# Patient Record
Sex: FEMALE | Race: WHITE | Employment: OTHER | URBAN - METROPOLITAN AREA
[De-identification: names, ages, dates, MRNs, and addresses within clinical notes are randomized per-mention and may not be internally consistent; named-entity substitution may affect disease eponyms.]

---

## 2018-11-16 ENCOUNTER — HOSPITAL ENCOUNTER (OUTPATIENT)
Dept: MAMMOGRAPHY | Age: 73
Discharge: HOME | End: 2018-11-18
Payer: MEDICARE

## 2018-11-16 DIAGNOSIS — Z12.31 VISIT FOR SCREENING MAMMOGRAM: ICD-10-CM

## 2018-11-16 PROCEDURE — 77067 SCR MAMMO BI INCL CAD: CPT

## 2018-12-05 ENCOUNTER — TELEPHONE (OUTPATIENT)
Dept: ONCOLOGY | Age: 73
End: 2018-12-05

## 2023-02-05 PROBLEM — M81.0 OSTEOPOROSIS: Status: ACTIVE | Noted: 2023-02-05

## 2023-02-05 PROBLEM — H25.812 COMBINED FORM OF AGE-RELATED CATARACT, LEFT EYE: Status: ACTIVE | Noted: 2023-02-05

## 2023-02-05 PROBLEM — C44.91 SKIN CANCER, BASAL CELL: Status: ACTIVE | Noted: 2023-02-05

## 2023-02-05 PROBLEM — H52.4 PRESBYOPIA OF BOTH EYES: Status: ACTIVE | Noted: 2023-02-05

## 2023-02-05 PROBLEM — I10 BENIGN ESSENTIAL HYPERTENSION: Status: ACTIVE | Noted: 2023-02-05

## 2023-02-05 PROBLEM — H52.13 BILATERAL MYOPIA: Status: ACTIVE | Noted: 2023-02-05

## 2023-02-05 PROBLEM — M79.671 RIGHT FOOT PAIN: Status: RESOLVED | Noted: 2023-02-05 | Resolved: 2023-02-05

## 2023-02-05 PROBLEM — M75.81 TENDINITIS OF RIGHT ROTATOR CUFF: Status: ACTIVE | Noted: 2023-02-05

## 2023-02-05 PROBLEM — H25.811 COMBINED FORM OF AGE-RELATED CATARACT, RIGHT EYE: Status: ACTIVE | Noted: 2023-02-05

## 2023-02-05 PROBLEM — M25.511 RIGHT SHOULDER PAIN: Status: RESOLVED | Noted: 2023-02-05 | Resolved: 2023-02-05

## 2023-02-05 PROBLEM — H02.839 DERMATOCHALASIS: Status: ACTIVE | Noted: 2023-02-05

## 2023-02-05 PROBLEM — E78.00 HYPERCHOLESTEROLEMIA: Status: ACTIVE | Noted: 2023-02-05

## 2023-02-05 PROBLEM — H40.003 GLAUCOMA SUSPECT OF BOTH EYES: Status: ACTIVE | Noted: 2023-02-05

## 2023-02-05 PROBLEM — J02.9 SORE THROAT: Status: RESOLVED | Noted: 2023-02-05 | Resolved: 2023-02-05

## 2023-02-05 RX ORDER — AMLODIPINE AND BENAZEPRIL HYDROCHLORIDE 5; 10 MG/1; MG/1
CAPSULE ORAL
COMMUNITY
End: 2023-04-18 | Stop reason: SDUPTHER

## 2023-02-05 RX ORDER — MULTIVIT-MIN/FA/LYCOPEN/LUTEIN .4-300-25
TABLET ORAL
COMMUNITY

## 2023-02-05 RX ORDER — ATORVASTATIN CALCIUM 10 MG/1
TABLET, FILM COATED ORAL
COMMUNITY
End: 2023-04-19

## 2023-02-05 RX ORDER — VIT C/E/ZN/COPPR/LUTEIN/ZEAXAN 250MG-90MG
CAPSULE ORAL
COMMUNITY

## 2023-03-16 ENCOUNTER — TELEPHONE (OUTPATIENT)
Dept: PRIMARY CARE | Facility: CLINIC | Age: 78
End: 2023-03-16
Payer: MEDICARE

## 2023-03-16 NOTE — TELEPHONE ENCOUNTER
Pt had to submit request for payment for Tdap. It was rejected because she did not have NDC from 7/22/2022.

## 2023-03-29 ENCOUNTER — TELEPHONE (OUTPATIENT)
Dept: PRIMARY CARE | Facility: CLINIC | Age: 78
End: 2023-03-29
Payer: MEDICARE

## 2023-03-29 NOTE — TELEPHONE ENCOUNTER
----- Message from Jose Eduardo Romero MD sent at 3/29/2023  1:05 PM EDT -----  Please advise patient mammogram was normal. Repeat in 1 year.

## 2023-04-18 ENCOUNTER — APPOINTMENT (OUTPATIENT)
Dept: PRIMARY CARE | Facility: CLINIC | Age: 78
End: 2023-04-18
Payer: MEDICARE

## 2023-04-18 ENCOUNTER — OFFICE VISIT (OUTPATIENT)
Dept: PRIMARY CARE | Facility: CLINIC | Age: 78
End: 2023-04-18
Payer: MEDICARE

## 2023-04-18 VITALS
WEIGHT: 140 LBS | BODY MASS INDEX: 21.97 KG/M2 | HEIGHT: 67 IN | DIASTOLIC BLOOD PRESSURE: 81 MMHG | HEART RATE: 71 BPM | SYSTOLIC BLOOD PRESSURE: 152 MMHG

## 2023-04-18 DIAGNOSIS — M81.0 AGE-RELATED OSTEOPOROSIS WITHOUT CURRENT PATHOLOGICAL FRACTURE: ICD-10-CM

## 2023-04-18 DIAGNOSIS — I10 BENIGN ESSENTIAL HYPERTENSION: Primary | ICD-10-CM

## 2023-04-18 DIAGNOSIS — E78.00 HYPERCHOLESTEROLEMIA: ICD-10-CM

## 2023-04-18 DIAGNOSIS — Z00.00 HEALTHCARE MAINTENANCE: ICD-10-CM

## 2023-04-18 PROBLEM — H02.821 CYST OF RIGHT UPPER EYELID: Status: RESOLVED | Noted: 2023-04-18 | Resolved: 2023-04-18

## 2023-04-18 PROBLEM — H01.001 BLEPHARITIS OF RIGHT UPPER EYELID: Status: RESOLVED | Noted: 2023-04-18 | Resolved: 2023-04-18

## 2023-04-18 PROBLEM — H00.11 CHALAZION OF RIGHT UPPER EYELID: Status: RESOLVED | Noted: 2023-04-18 | Resolved: 2023-04-18

## 2023-04-18 PROBLEM — H11.221 PYOGENIC GRANULOMA OF RIGHT CONJUNCTIVA: Status: RESOLVED | Noted: 2023-04-18 | Resolved: 2023-04-18

## 2023-04-18 PROCEDURE — 99214 OFFICE O/P EST MOD 30 MIN: CPT | Performed by: FAMILY MEDICINE

## 2023-04-18 PROCEDURE — 3077F SYST BP >= 140 MM HG: CPT | Performed by: FAMILY MEDICINE

## 2023-04-18 PROCEDURE — 1036F TOBACCO NON-USER: CPT | Performed by: FAMILY MEDICINE

## 2023-04-18 PROCEDURE — 36415 COLL VENOUS BLD VENIPUNCTURE: CPT | Performed by: FAMILY MEDICINE

## 2023-04-18 PROCEDURE — 3079F DIAST BP 80-89 MM HG: CPT | Performed by: FAMILY MEDICINE

## 2023-04-18 PROCEDURE — 80061 LIPID PANEL: CPT

## 2023-04-18 PROCEDURE — 80053 COMPREHEN METABOLIC PANEL: CPT

## 2023-04-18 PROCEDURE — 1159F MED LIST DOCD IN RCRD: CPT | Performed by: FAMILY MEDICINE

## 2023-04-18 RX ORDER — AMLODIPINE AND BENAZEPRIL HYDROCHLORIDE 5; 10 MG/1; MG/1
1 CAPSULE ORAL DAILY
Qty: 90 CAPSULE | Refills: 1 | Status: SHIPPED | OUTPATIENT
Start: 2023-04-18 | End: 2023-10-17 | Stop reason: SDUPTHER

## 2023-04-18 ASSESSMENT — PATIENT HEALTH QUESTIONNAIRE - PHQ9
2. FEELING DOWN, DEPRESSED OR HOPELESS: NOT AT ALL
1. LITTLE INTEREST OR PLEASURE IN DOING THINGS: NOT AT ALL
SUM OF ALL RESPONSES TO PHQ9 QUESTIONS 1 AND 2: 0

## 2023-04-19 LAB
ALANINE AMINOTRANSFERASE (SGPT) (U/L) IN SER/PLAS: 10 U/L (ref 7–45)
ALBUMIN (G/DL) IN SER/PLAS: 4.4 G/DL (ref 3.4–5)
ALKALINE PHOSPHATASE (U/L) IN SER/PLAS: 61 U/L (ref 33–136)
ANION GAP IN SER/PLAS: 12 MMOL/L (ref 10–20)
ASPARTATE AMINOTRANSFERASE (SGOT) (U/L) IN SER/PLAS: 22 U/L (ref 9–39)
BILIRUBIN TOTAL (MG/DL) IN SER/PLAS: 0.5 MG/DL (ref 0–1.2)
CALCIUM (MG/DL) IN SER/PLAS: 9.8 MG/DL (ref 8.6–10.6)
CARBON DIOXIDE, TOTAL (MMOL/L) IN SER/PLAS: 29 MMOL/L (ref 21–32)
CHLORIDE (MMOL/L) IN SER/PLAS: 105 MMOL/L (ref 98–107)
CHOLESTEROL (MG/DL) IN SER/PLAS: 246 MG/DL (ref 0–199)
CHOLESTEROL IN HDL (MG/DL) IN SER/PLAS: 70.2 MG/DL
CHOLESTEROL/HDL RATIO: 3.5
CREATININE (MG/DL) IN SER/PLAS: 0.68 MG/DL (ref 0.5–1.05)
GFR FEMALE: 90 ML/MIN/1.73M2
GLUCOSE (MG/DL) IN SER/PLAS: 84 MG/DL (ref 74–99)
LDL: 158 MG/DL (ref 0–99)
POTASSIUM (MMOL/L) IN SER/PLAS: 3.8 MMOL/L (ref 3.5–5.3)
PROTEIN TOTAL: 7.3 G/DL (ref 6.4–8.2)
SODIUM (MMOL/L) IN SER/PLAS: 142 MMOL/L (ref 136–145)
TRIGLYCERIDE (MG/DL) IN SER/PLAS: 90 MG/DL (ref 0–149)
UREA NITROGEN (MG/DL) IN SER/PLAS: 12 MG/DL (ref 6–23)
VLDL: 18 MG/DL (ref 0–40)

## 2023-04-20 DIAGNOSIS — C44.91 BASAL CELL CARCINOMA (BCC), UNSPECIFIED SITE: Primary | ICD-10-CM

## 2023-04-30 PROBLEM — Z00.00 HEALTHCARE MAINTENANCE: Status: ACTIVE | Noted: 2023-04-30

## 2023-04-30 ASSESSMENT — ENCOUNTER SYMPTOMS
GASTROINTESTINAL NEGATIVE: 1
MUSCULOSKELETAL NEGATIVE: 1
CARDIOVASCULAR NEGATIVE: 1
VOMITING: 0
ENDOCRINE NEGATIVE: 1
FEVER: 0
HEMATOLOGIC/LYMPHATIC NEGATIVE: 1
ALLERGIC/IMMUNOLOGIC NEGATIVE: 1
NEUROLOGICAL NEGATIVE: 1
CHILLS: 0
NAUSEA: 0
RESPIRATORY NEGATIVE: 1
PSYCHIATRIC NEGATIVE: 1

## 2023-05-01 NOTE — PROGRESS NOTES
"Subjective   Patient ID: Sandrine Carter is a 77 y.o. female who presents for Blood Pressure Check (At 123/76 at home ) and Follow-up (Talk about gyn exam when due ).      Blood pressures controlled denies chest pain shortness of breath  Has family history of hemophilia CBC is being monitored MCV was low but has improved  History of osteoporosis DEXA scan is due  Monitoring by GYN exam      Current Outpatient Medications on File Prior to Visit   Medication Sig Dispense Refill    cholecalciferol (Vitamin D-3) 25 MCG (1000 UT) capsule TAKE AS DIRECTED.      multivit-iron-minerals-folic acid (Centrum Silver) 0.4 mg-300 mcg- 250 mcg tab Centrum Silver TABS   Refills: 0       Active       No current facility-administered medications on file prior to visit.        Review of Systems   Constitutional:  Negative for chills and fever.   HENT: Negative.     Respiratory: Negative.     Cardiovascular: Negative.    Gastrointestinal: Negative.  Negative for nausea and vomiting.   Endocrine: Negative.    Genitourinary: Negative.    Musculoskeletal: Negative.    Skin: Negative.  Negative for rash.   Allergic/Immunologic: Negative.    Neurological: Negative.    Hematological: Negative.    Psychiatric/Behavioral: Negative.     All other systems reviewed and are negative.      Objective   Blood Pressure 152/81   Pulse 71   Height 1.689 m (5' 6.5\")   Weight 63.5 kg (140 lb)   Body Mass Index 22.26 kg/m²   BSA: 1.73 meters squared  Growth percentiles: Facility age limit for growth %christine is 20 years. Facility age limit for growth %christine is 20 years.   Office Visit on 04/18/2023   Component Date Value Ref Range Status    Glucose 04/18/2023 84  74 - 99 mg/dL Final    Sodium 04/18/2023 142  136 - 145 mmol/L Final    Potassium 04/18/2023 3.8  3.5 - 5.3 mmol/L Final    Chloride 04/18/2023 105  98 - 107 mmol/L Final    Bicarbonate 04/18/2023 29  21 - 32 mmol/L Final    Anion Gap 04/18/2023 12  10 - 20 mmol/L Final    Urea Nitrogen 04/18/2023 " 12  6 - 23 mg/dL Final    Creatinine 04/18/2023 0.68  0.50 - 1.05 mg/dL Final    GFR Female 04/18/2023 90  >90 mL/min/1.73m2 Final     CALCULATIONS OF ESTIMATED GFR ARE PERFORMED   USING THE 2021 CKD-EPI STUDY REFIT EQUATION   WITHOUT THE RACE VARIABLE FOR THE IDMS-TRACEABLE   CREATININE METHODS.    https://jasn.asnjournals.org/content/early/2021/09/22/ASN.1357977822    Calcium 04/18/2023 9.8  8.6 - 10.6 mg/dL Final    Albumin 04/18/2023 4.4  3.4 - 5.0 g/dL Final    Alkaline Phosphatase 04/18/2023 61  33 - 136 U/L Final    Total Protein 04/18/2023 7.3  6.4 - 8.2 g/dL Final    AST 04/18/2023 22  9 - 39 U/L Final    Total Bilirubin 04/18/2023 0.5  0.0 - 1.2 mg/dL Final    ALT (SGPT) 04/18/2023 10  7 - 45 U/L Final     Patients treated with Sulfasalazine may generate    falsely decreased results for ALT.    Cholesterol 04/18/2023 246 (H)  0 - 199 mg/dL Final    .      AGE      DESIRABLE   BORDERLINE HIGH   HIGH     0-19 Y     0 - 169       170 - 199     >/= 200    20-24 Y     0 - 189       190 - 224     >/= 225         >24 Y     0 - 199       200 - 239     >/= 240   **All ranges are based on fasting samples. Specific   therapeutic targets will vary based on patient-specific   cardiac risk.  .   Pediatric guidelines reference:Pediatrics 2011, 128(S5).   Adult guidelines reference: NCEP ATPIII Guidelines,     JOHANA 2001, 258:2486-97  .   Venipuncture immediately after or during the    administration of Metamizole may lead to falsely   low results. Testing should be performed immediately   prior to Metamizole dosing.    HDL 04/18/2023 70.2  mg/dL Final    .      AGE      VERY LOW   LOW     NORMAL    HIGH       0-19 Y       < 35   < 40     40-45     ----    20-24 Y       ----   < 40       >45     ----      >24 Y       ----   < 40     40-60      >60  .    Cholesterol/HDL Ratio 04/18/2023 3.5   Final    REF VALUES  DESIRABLE  < 3.4  HIGH RISK  > 5.0    LDL 04/18/2023 158 (H)  0 - 99 mg/dL Final    .                            NEAR      BORD      AGE      DESIRABLE  OPTIMAL    HIGH     HIGH     VERY HIGH     0-19 Y     0 - 109     ---    110-129   >/= 130     ----    20-24 Y     0 - 119     ---    120-159   >/= 160     ----      >24 Y     0 -  99   100-129  130-159   160-189     >/=190  .    VLDL 04/18/2023 18  0 - 40 mg/dL Final    Triglycerides 04/18/2023 90  0 - 149 mg/dL Final    .      AGE      DESIRABLE   BORDERLINE HIGH   HIGH     VERY HIGH   0 D-90 D    19 - 174         ----         ----        ----  91 D- 9 Y     0 -  74        75 -  99     >/= 100      ----    10-19 Y     0 -  89        90 - 129     >/= 130      ----    20-24 Y     0 - 114       115 - 149     >/= 150      ----         >24 Y     0 - 149       150 - 199    200- 499    >/= 500  .   Venipuncture immediately after or during the    administration of Metamizole may lead to falsely   low results. Testing should be performed immediately   prior to Metamizole dosing.      Physical Exam  Vitals and nursing note reviewed.   Constitutional:       Appearance: Normal appearance.   HENT:      Head: Normocephalic and atraumatic.      Right Ear: Tympanic membrane normal.      Left Ear: Tympanic membrane normal.      Nose: Nose normal.      Mouth/Throat:      Mouth: Mucous membranes are moist.   Eyes:      Pupils: Pupils are equal, round, and reactive to light.   Cardiovascular:      Rate and Rhythm: Normal rate and regular rhythm.      Pulses: Normal pulses.      Heart sounds: Normal heart sounds.   Pulmonary:      Effort: Pulmonary effort is normal.      Breath sounds: Normal breath sounds.   Abdominal:      General: Abdomen is flat. Bowel sounds are normal.      Palpations: Abdomen is soft.   Musculoskeletal:         General: Normal range of motion.      Cervical back: Normal range of motion and neck supple.   Skin:     General: Skin is warm and dry.      Capillary Refill: Capillary refill takes less than 2 seconds.   Neurological:      General: No focal deficit present.       Mental Status: She is alert and oriented to person, place, and time.   Psychiatric:         Mood and Affect: Mood normal.         Assessment/Plan   Problem List Items Addressed This Visit       Benign essential hypertension - Primary    Relevant Medications    amLODIPine-benazepriL (Lotrel) 5-10 mg capsule    Other Relevant Orders    Comprehensive metabolic panel (Completed)    Hypercholesterolemia    Relevant Orders    Comprehensive metabolic panel (Completed)    Lipid Panel (Completed)    Osteoporosis    Relevant Orders    XR DEXA bone density    Healthcare maintenance    Relevant Orders    Referral to Gynecology

## 2023-05-02 ENCOUNTER — CLINICAL SUPPORT (OUTPATIENT)
Dept: PRIMARY CARE | Facility: CLINIC | Age: 78
End: 2023-05-02
Payer: MEDICARE

## 2023-05-02 DIAGNOSIS — C44.91 BASAL CELL CARCINOMA (BCC), UNSPECIFIED SITE: ICD-10-CM

## 2023-05-02 PROCEDURE — 36415 COLL VENOUS BLD VENIPUNCTURE: CPT

## 2023-05-02 PROCEDURE — 85027 COMPLETE CBC AUTOMATED: CPT

## 2023-05-03 ENCOUNTER — TELEPHONE (OUTPATIENT)
Dept: PRIMARY CARE | Facility: CLINIC | Age: 78
End: 2023-05-03
Payer: MEDICARE

## 2023-05-03 LAB
ERYTHROCYTE DISTRIBUTION WIDTH (RATIO) BY AUTOMATED COUNT: 13.2 % (ref 11.5–14.5)
ERYTHROCYTE MEAN CORPUSCULAR HEMOGLOBIN CONCENTRATION (G/DL) BY AUTOMATED: 30.7 G/DL (ref 32–36)
ERYTHROCYTE MEAN CORPUSCULAR VOLUME (FL) BY AUTOMATED COUNT: 90 FL (ref 80–100)
ERYTHROCYTES (10*6/UL) IN BLOOD BY AUTOMATED COUNT: 4.52 X10E12/L (ref 4–5.2)
HEMATOCRIT (%) IN BLOOD BY AUTOMATED COUNT: 40.7 % (ref 36–46)
HEMOGLOBIN (G/DL) IN BLOOD: 12.5 G/DL (ref 12–16)
LEUKOCYTES (10*3/UL) IN BLOOD BY AUTOMATED COUNT: 8.2 X10E9/L (ref 4.4–11.3)
NRBC (PER 100 WBCS) BY AUTOMATED COUNT: 0 /100 WBC (ref 0–0)
PLATELETS (10*3/UL) IN BLOOD AUTOMATED COUNT: 278 X10E9/L (ref 150–450)

## 2023-05-03 NOTE — TELEPHONE ENCOUNTER
----- Message from Jose Eduardo Romero MD sent at 5/3/2023 12:24 PM EDT -----  Please Advise patient that CBC is fairly normal MCHC remains slightly low but stable.

## 2023-05-18 ENCOUNTER — HOSPITAL ENCOUNTER (OUTPATIENT)
Dept: DATA CONVERSION | Facility: HOSPITAL | Age: 78
End: 2023-05-18
Attending: OPHTHALMOLOGY | Admitting: OPHTHALMOLOGY
Payer: MEDICARE

## 2023-05-18 DIAGNOSIS — Z85.828 PERSONAL HISTORY OF OTHER MALIGNANT NEOPLASM OF SKIN: ICD-10-CM

## 2023-05-18 DIAGNOSIS — I10 ESSENTIAL (PRIMARY) HYPERTENSION: ICD-10-CM

## 2023-05-18 DIAGNOSIS — Z90.89 ACQUIRED ABSENCE OF OTHER ORGANS: ICD-10-CM

## 2023-05-18 DIAGNOSIS — E78.5 HYPERLIPIDEMIA, UNSPECIFIED: ICD-10-CM

## 2023-05-18 DIAGNOSIS — D64.9 ANEMIA, UNSPECIFIED: ICD-10-CM

## 2023-05-18 DIAGNOSIS — Z90.710 ACQUIRED ABSENCE OF BOTH CERVIX AND UTERUS: ICD-10-CM

## 2023-05-18 DIAGNOSIS — H25.812 COMBINED FORMS OF AGE-RELATED CATARACT, LEFT EYE: ICD-10-CM

## 2023-05-18 DIAGNOSIS — M81.0 AGE-RELATED OSTEOPOROSIS WITHOUT CURRENT PATHOLOGICAL FRACTURE: ICD-10-CM

## 2023-06-22 ENCOUNTER — HOSPITAL ENCOUNTER (OUTPATIENT)
Dept: DATA CONVERSION | Facility: HOSPITAL | Age: 78
End: 2023-06-22
Attending: OPHTHALMOLOGY | Admitting: OPHTHALMOLOGY
Payer: MEDICARE

## 2023-06-22 DIAGNOSIS — I10 ESSENTIAL (PRIMARY) HYPERTENSION: ICD-10-CM

## 2023-06-22 DIAGNOSIS — E78.5 HYPERLIPIDEMIA, UNSPECIFIED: ICD-10-CM

## 2023-06-22 DIAGNOSIS — D64.9 ANEMIA, UNSPECIFIED: ICD-10-CM

## 2023-06-22 DIAGNOSIS — H25.811 COMBINED FORMS OF AGE-RELATED CATARACT, RIGHT EYE: ICD-10-CM

## 2023-09-07 VITALS
HEART RATE: 67 BPM | SYSTOLIC BLOOD PRESSURE: 163 MMHG | RESPIRATION RATE: 16 BRPM | DIASTOLIC BLOOD PRESSURE: 72 MMHG | TEMPERATURE: 98.8 F

## 2023-09-30 NOTE — H&P
History of Present Illness:   History Present Illness:  Reason for surgery: cataract os   HPI:    76 yo woman with visually significant cataract in the left eye presenting for cataract surgery in the let eye.    Allergies:        Allergies:  ·  Bactrim : Other  ·  Keflex : Rash  ·  Septra : Rash  ·  doxycycline : Other  ·  codeine : [place]       Intolerances:  ·  Naprosyn : Constipation    Home Medication Review:   Home Medications Reviewed: yes     Impression/Procedure:   ·  Impression and Planned Procedure: 76 yo woman with visually significant cataract in the left eye presenting for cataract surgery in the let eye.       ERAS (Enhanced Recovery After Surgery):  ·  ERAS Patient: no       Physical Exam by System:    Constitutional: Well developed, awake, no distress,  alert and cooperative   Eyes: cataract os   ENMT: mucous membranes moist, no apparent injury,  no lesions seen   Head/Neck: No apparent injury   Respiratory/Thorax: breathing comfortably   Cardiovascular: well perfused   Gastrointestinal: Nondistended, soft, non-tender   Extremities: no cyanosis/edema   Neurological: alert   Psychological: Appropriate mood and behavior   Skin: Warm and dry, no lesions, no rashes     Consent:   COVID-19 Consent:  ·  COVID-19 Risk Consent Surgeon has reviewed key risks related to the risk of jhon COVID-19 and if they contract COVID-19 what the risks are.     Attestation:   Note Completion:  I am a:  Resident/Fellow   Attending Attestation I saw and evaluated the patient.  I personally obtained the key and critical portions of the history and physical exam or was physically present for key and  critical portions performed by the resident/fellow. I reviewed the resident/fellow?s documentation and discussed the patient with the resident/fellow.  I agree with the resident/fellow?s medical decision making as documented in the note.     I personally evaluated the patient on 18-May-2023         Electronic  Signatures:  Tone Cuellar (Resident))  (Signed 18-May-2023 07:11)   Authored: History of Present Illness, Allergies, Home  Medication Review, Impression/Procedure, ERAS, Physical Exam, Consent, Note Completion  Key Nair)  (Signed 18-May-2023 08:23)   Authored: Note Completion   Co-Signer: History of Present Illness, Allergies, Home Medication Review, Impression/Procedure, ERAS, Physical Exam, Consent, Note Completion      Last Updated: 18-May-2023 08:23 by Key Nair)

## 2023-09-30 NOTE — H&P
History of Present Illness:   History Present Illness:  Reason for surgery: cataract od   HPI:    78 yo woman with visually significant cataract OD presents for cataract surgery OD.     Allergies:        Allergies:  ·  Bactrim : Other  ·  Keflex : Rash  ·  Septra : Rash  ·  doxycycline : Other  ·  codeine : [place]       Intolerances:  ·  Naprosyn : Constipation    Home Medication Review:   Home Medications Reviewed: yes     Impression/Procedure:   ·  Impression and Planned Procedure: 78 yo woman with visually significant cataract OD presents for cataract surgery OD.       ERAS (Enhanced Recovery After Surgery):  ·  ERAS Patient: no       Vital Signs:  Temperature C: 37.1 degrees C   Temperature F: 98.7 degrees F   Heart Rate: 67 beats per minute   Respiratory Rate: 16 breath per minute   Blood Pressure Systolic: 163 mm/Hg   Blood Pressure Diastolic: 72 mm/Hg     Physical Exam by System:    Constitutional: Well developed, awake, no distress,  alert and cooperative   Eyes: cataract od   ENMT: mucous membranes moist, no apparent injury,  no lesions seen   Head/Neck: No apparent injury   Respiratory/Thorax: breathing comfortably   Cardiovascular: well perfused   Gastrointestinal: Nondistended, soft, non-tender   Extremities: no cyanosis/edema   Neurological: alert   Psychological: Appropriate mood and behavior   Skin: Warm and dry, no lesions, no rashes     Consent:   COVID-19 Consent:  ·  COVID-19 Risk Consent Surgeon has reviewed key risks related to the risk of jhon COVID-19 and if they contract COVID-19 what the risks are.     Attestation:   Note Completion:  I am a:  Resident/Fellow   Attending Attestation I saw and evaluated the patient.  I personally obtained the key and critical portions of the history and physical exam or was physically present for key and  critical portions performed by the resident/fellow. I reviewed the resident/fellow?s documentation and discussed the patient with the  resident/fellow.  I agree with the resident/fellow?s medical decision making as documented in the note.     I personally evaluated the patient on 22-Jun-2023         Electronic Signatures:  Tone Cuellar (Resident))  (Signed 22-Jun-2023 11:42)   Authored: History of Present Illness, Allergies, Home  Medication Review, Impression/Procedure, ERAS, Physical Exam, Consent, Note Completion  Key Nair)  (Signed 22-Jun-2023 12:13)   Authored: Note Completion   Co-Signer: History of Present Illness, Allergies, Home Medication Review, Impression/Procedure, ERAS, Physical Exam, Consent, Note Completion      Last Updated: 22-Jun-2023 12:13 by Key Nair)

## 2023-10-02 NOTE — OP NOTE
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   Post-Procedure Diagnosis: Combined cataract - Right  eye   Procedure: Phacoemulsification of cataract with intraocular  lens implant - RIGHT Eye   Surgeon: Key Nair MD   Resident/Fellow/Other Assistant: Tone Cuellar MD   Estimated Blood Loss (mL): none   Specimen: no   Complications: None   Findings: As above     Operative Report Dictated:  Dictation: not applicable - note contains Operative  Report   Note Recipients: Jose Eduardo Romero MD - [id number]  [preferred]   Operative Report:    Patient name: Sandrine Carter  YOB: 1945  MRN: [id number]  Date of surgery: 06/22/2023  Preoperative diagnosis: Combined cataract of the RIGHT eye  Postoperative diagnosis: Combined cataract of the RIGHT eye  Procedure: Phacoemulsification of cataract with insertion of intraocular lens, RIGHT eye   Surgeon: Key Nair MD  Resident: Tone Cuellar MD  Anesthesia: MAC and Topical  Complications: None  Lens Inserted: Rakesh ACU0T0 with a power of +14.5 D, serial # [id number]. Exp: 02/12/2025.     Procedure description: After the risks, benefits, and alternatives of the planned procedure were discussed with the patient, informed consent was obtained at the preoperative evaluation. On the day of surgery, there were no updates to the consent form  and any patient questions were answered. The patient was correctly identified in the preoperative area and the RIGHT eye was marked as the operative eye. Dilating  drops were instilled into the RIGHT eye in the preoperative area. The patient was then  taken back to the operating room and placed under sedation. The patient was prepped and draped in the standard, sterile ophthalmic fashion in preparation for intraocular surgery.    A lid speculum was placed into the RIGHT palpebral fissure and the operating microscope was brought into position. A paracentesis was made in superotemporal cornea  at the limbus with a 1.0mm side port blade using a cotton tipped applicator to provide  countertraction. Intracameral lidocaine was injected into the anterior chamber followed by Viscoat viscoelastic. Using 0.12 forceps to stabilize the globe, a 2.4mm keratome blade was used to create a limbal clear-corneal incision inferotemporally. A bent-needle  cystotome and Utrata forceps were used to create a continuous curvilinear capsulorrhexis. Balanced salt saline solution on a blunt-tipped cannula was used to achieve hydrodissection.    A phacoemulsification device and a [place] spatula were used to remove the nucleus using a divide-and-conquer technique. Residual cortical material was removed with the irrigation and aspiration handpiece. Provisc viscoelastic was then injected into  the eye to reform the anterior chamber and to open the capsular bag. The posterior capsule was inspected and found to be clean and intact. The intraocular lens, an Rakesh ACU0T0 with a power of +14.50 diopters was injected into the capsular bag. A lens  positioner was used to center the lens and ensure good position within the bag. The remaining viscoelastic was removed using irrigation and aspiration. Balanced salt saline solution on a blunt-tipped cannula was then used to hydrate the corneal stroma  adjacent to the main wound and paracentesis site as well as to reform the anterior chamber. The wound was checked and found to be watertight with normal intraocular pressure verified using digital palpation. At the conclusion of the case, a well-centered  intraocular lens with a good red reflex was observed. Tetracaine, betadine, BSS, and prednisolone acetate drops were instilled into the RIGHT eye. The lid speculum and drapes were removed. A clear plastic shield was then taped over the eye. The patient  was taken to the recovery room in stable condition, having tolerated the procedure well. There were no complications.      Attestation:   Note  Completion:  Attending Attestation I performed the procedure without a resident   Comments/ Additional Findings    I performed the surgery with the resident as an observer        Electronic Signatures:  Key Nair)  (Signed 22-Jun-2023 17:16)   Authored: Post Operative Note, Note Completion      Last Updated: 22-Jun-2023 17:16 by Key Nair)

## 2023-10-02 NOTE — OP NOTE
Post Operative Note:     PreOp Diagnosis: Combined cataract - Left eye   Post-Procedure Diagnosis: Combined cataract - Left  eye   Procedure: Phacoemulsification of cataract with intraocular  lens implant - LEFT Eye   Surgeon: Key Nair MD   Resident/Fellow/Other Assistant: Tone Cuellar MD   Estimated Blood Loss (mL): none   Specimen: no   Complications: None   Findings: As above     Operative Report Dictated:  Dictation: not applicable - note contains Operative  Report   Note Recipients: Jose Eduardo Romero MD - [id number]  [preferred]   Operative Report:    Patient name: Sandrine Carter  YOB: 1945  MRN: [id number]  Date of surgery: 05/18/2023  Preoperative diagnosis: Combined cataract of the LEFT eye  Postoperative diagnosis: Combined cataract of the LEFT eye  Procedure: Phacoemulsification of cataract with insertion of intraocular lens, LEFT eye  Surgeon: Key Nair MD  Resident: Tone Cuellar MD  Anesthesia: MAC and Topical  Complications: None  Lens Inserted: Rakesh ACU0T0 with a power of +14.50 D, serial #[id number]. Exp: 07/12/2024.     Procedure description: After the risks, benefits, and alternatives of the planned procedure were discussed with the patient, informed consent was obtained at the preoperative evaluation. On the day of surgery, there were no updates to the consent form  and any patient questions were answered. The patient was correctly identified in the preoperative area and the LEFT eye was marked as the operative eye. Dilating and antibiotic drops were instilled into the LEFT eye in the preoperative area. The patient  was then taken back to the operating room and placed under sedation. The patient was prepped and draped in the standard, sterile ophthalmic fashion in preparation for intraocular surgery.    A lid speculum was placed into the LEFT palpebral fissure and the operating microscope was brought into position. A paracentesis was made in inferotemporal  cornea at the limbus with a 1.0mm side port blade using a cotton tipped applicator to provide countertraction.  Intracameral lidocaine was injected into the anterior chamber followed by Viscoat viscoelastic. Using 0.12 forceps to stabilize the globe, a 2.4mm keratome blade was used to create a limbal clear-corneal incision superotemporally. A bent-needle cystotome  and Utrata forceps were used to create a continuous curvilinear capsulorrhexis. Balanced salt saline solution on a blunt-tipped cannula was used to achieve hydrodissection.    A phacoemulsification device and a [place] spatula were used to remove the nucleus using a divide-and-conquer technique. Residual cortical material was removed with the irrigation and aspiration handpiece. Provisc viscoelastic was then injected into  the eye to reform the anterior chamber and to open the capsular bag. The posterior capsule was inspected and found to be clean and intact. The intraocular lens, an Rakesh ACU0T0 with a power of +14.50 diopters was injected into the capsular bag. A lens  positioner was used to center the lens and ensure good position within the bag. The remaining viscoelastic was removed using irrigation and aspiration. Balanced salt saline solution on a blunt-tipped cannula was then used to hydrate the corneal stroma  adjacent to the main wound and paracentesis site as well as to reform the anterior chamber.The wound was checked and found to be watertight with normal intraocular pressure verified using digital palpation. At the conclusion of the case, a well-centered  intraocular lens with a good red reflex was observed. Tetracaine, betadine, BSS, and prednisolone acetate drops were instilled into the LEFT eye. The lid speculum and drapes were removed. . A clear plastic shield was then taped over the eye. The patient  was taken to the recovery room in stable condition, having tolerated the procedure well. There were no complications.      Attestation:    Note Completion:  Attending Attestation I performed the procedure without a resident   Comments/ Additional Findings    I performed the surgery with the resident as an observer        Electronic Signatures:  Key Nair)  (Signed 18-May-2023 09:07)   Authored: Post Operative Note, Note Completion      Last Updated: 18-May-2023 09:07 by Key Nair)

## 2023-10-16 PROBLEM — Z96.1 PSEUDOPHAKIA OF LEFT EYE: Status: ACTIVE | Noted: 2023-10-16

## 2023-10-16 PROBLEM — E78.5 DYSLIPIDEMIA: Status: ACTIVE | Noted: 2023-10-16

## 2023-10-16 PROBLEM — E16.9: Status: ACTIVE | Noted: 2018-02-28

## 2023-10-16 PROBLEM — Z96.1 PSEUDOPHAKIA OF RIGHT EYE: Status: ACTIVE | Noted: 2023-10-16

## 2023-10-16 RX ORDER — LATANOPROST/PF 0.005 %
DROPS OPHTHALMIC (EYE) EVERY 24 HOURS
COMMUNITY
End: 2023-10-17 | Stop reason: ALTCHOICE

## 2023-10-17 ENCOUNTER — OFFICE VISIT (OUTPATIENT)
Dept: PRIMARY CARE | Facility: CLINIC | Age: 78
End: 2023-10-17
Payer: MEDICARE

## 2023-10-17 VITALS
WEIGHT: 140 LBS | SYSTOLIC BLOOD PRESSURE: 122 MMHG | HEART RATE: 73 BPM | HEIGHT: 66 IN | DIASTOLIC BLOOD PRESSURE: 78 MMHG | BODY MASS INDEX: 22.5 KG/M2

## 2023-10-17 DIAGNOSIS — E78.00 HYPERCHOLESTEROLEMIA: ICD-10-CM

## 2023-10-17 DIAGNOSIS — I10 BENIGN ESSENTIAL HYPERTENSION: ICD-10-CM

## 2023-10-17 DIAGNOSIS — Z00.00 ROUTINE GENERAL MEDICAL EXAMINATION AT HEALTH CARE FACILITY: Primary | ICD-10-CM

## 2023-10-17 DIAGNOSIS — M81.0 AGE-RELATED OSTEOPOROSIS WITHOUT CURRENT PATHOLOGICAL FRACTURE: ICD-10-CM

## 2023-10-17 LAB
25(OH)D3 SERPL-MCNC: 44 NG/ML (ref 30–100)
ALBUMIN SERPL BCP-MCNC: 4.5 G/DL (ref 3.4–5)
ALP SERPL-CCNC: 57 U/L (ref 33–136)
ALT SERPL W P-5'-P-CCNC: 14 U/L (ref 7–45)
ANION GAP SERPL CALC-SCNC: 14 MMOL/L (ref 10–20)
AST SERPL W P-5'-P-CCNC: 25 U/L (ref 9–39)
BILIRUB SERPL-MCNC: 0.6 MG/DL (ref 0–1.2)
BUN SERPL-MCNC: 12 MG/DL (ref 6–23)
CALCIUM SERPL-MCNC: 9.7 MG/DL (ref 8.6–10.6)
CHLORIDE SERPL-SCNC: 105 MMOL/L (ref 98–107)
CHOLEST SERPL-MCNC: 259 MG/DL (ref 0–199)
CHOLESTEROL/HDL RATIO: 3.7
CO2 SERPL-SCNC: 27 MMOL/L (ref 21–32)
CREAT SERPL-MCNC: 0.61 MG/DL (ref 0.5–1.05)
ERYTHROCYTE [DISTWIDTH] IN BLOOD BY AUTOMATED COUNT: 13.4 % (ref 11.5–14.5)
GFR SERPL CREATININE-BSD FRML MDRD: >90 ML/MIN/1.73M*2
GLUCOSE SERPL-MCNC: 81 MG/DL (ref 74–99)
HCT VFR BLD AUTO: 43.4 % (ref 36–46)
HDLC SERPL-MCNC: 69.1 MG/DL
HGB BLD-MCNC: 12.9 G/DL (ref 12–16)
LDLC SERPL CALC-MCNC: 175 MG/DL
MCH RBC QN AUTO: 27.9 PG (ref 26–34)
MCHC RBC AUTO-ENTMCNC: 29.7 G/DL (ref 32–36)
MCV RBC AUTO: 94 FL (ref 80–100)
NON HDL CHOLESTEROL: 190 MG/DL (ref 0–149)
NRBC BLD-RTO: 0 /100 WBCS (ref 0–0)
PLATELET # BLD AUTO: 305 X10*3/UL (ref 150–450)
PMV BLD AUTO: 11.4 FL (ref 7.5–11.5)
POTASSIUM SERPL-SCNC: 3.7 MMOL/L (ref 3.5–5.3)
PROT SERPL-MCNC: 7.5 G/DL (ref 6.4–8.2)
RBC # BLD AUTO: 4.62 X10*6/UL (ref 4–5.2)
SODIUM SERPL-SCNC: 142 MMOL/L (ref 136–145)
TRIGL SERPL-MCNC: 77 MG/DL (ref 0–149)
TSH SERPL-ACNC: 2.9 MIU/L (ref 0.44–3.98)
VLDL: 15 MG/DL (ref 0–40)
WBC # BLD AUTO: 6.5 X10*3/UL (ref 4.4–11.3)

## 2023-10-17 PROCEDURE — 36415 COLL VENOUS BLD VENIPUNCTURE: CPT

## 2023-10-17 PROCEDURE — 3078F DIAST BP <80 MM HG: CPT | Performed by: FAMILY MEDICINE

## 2023-10-17 PROCEDURE — 99214 OFFICE O/P EST MOD 30 MIN: CPT | Performed by: FAMILY MEDICINE

## 2023-10-17 PROCEDURE — 80053 COMPREHEN METABOLIC PANEL: CPT

## 2023-10-17 PROCEDURE — 1036F TOBACCO NON-USER: CPT | Performed by: FAMILY MEDICINE

## 2023-10-17 PROCEDURE — 3074F SYST BP LT 130 MM HG: CPT | Performed by: FAMILY MEDICINE

## 2023-10-17 PROCEDURE — 85027 COMPLETE CBC AUTOMATED: CPT

## 2023-10-17 PROCEDURE — 82306 VITAMIN D 25 HYDROXY: CPT

## 2023-10-17 PROCEDURE — 1170F FXNL STATUS ASSESSED: CPT | Performed by: FAMILY MEDICINE

## 2023-10-17 PROCEDURE — 1160F RVW MEDS BY RX/DR IN RCRD: CPT | Performed by: FAMILY MEDICINE

## 2023-10-17 PROCEDURE — 84443 ASSAY THYROID STIM HORMONE: CPT

## 2023-10-17 PROCEDURE — 1159F MED LIST DOCD IN RCRD: CPT | Performed by: FAMILY MEDICINE

## 2023-10-17 PROCEDURE — G0439 PPPS, SUBSEQ VISIT: HCPCS | Performed by: FAMILY MEDICINE

## 2023-10-17 PROCEDURE — 80061 LIPID PANEL: CPT

## 2023-10-17 RX ORDER — TRETINOIN 1 MG/G
CREAM TOPICAL
COMMUNITY
Start: 2023-09-20

## 2023-10-17 RX ORDER — AMLODIPINE AND BENAZEPRIL HYDROCHLORIDE 5; 10 MG/1; MG/1
1 CAPSULE ORAL DAILY
Qty: 90 CAPSULE | Refills: 1 | Status: SHIPPED | OUTPATIENT
Start: 2023-10-17 | End: 2024-06-03

## 2023-10-17 ASSESSMENT — ENCOUNTER SYMPTOMS
DEPRESSION: 0
OCCASIONAL FEELINGS OF UNSTEADINESS: 0
LOSS OF SENSATION IN FEET: 0

## 2023-10-17 ASSESSMENT — ACTIVITIES OF DAILY LIVING (ADL)
GROCERY_SHOPPING: INDEPENDENT
DOING_HOUSEWORK: INDEPENDENT
DRESSING: INDEPENDENT
BATHING: INDEPENDENT
TAKING_MEDICATION: INDEPENDENT
MANAGING_FINANCES: INDEPENDENT

## 2023-10-17 NOTE — PROGRESS NOTES
"Subjective   Reason for Visit: Sandrine Carter is an 78 y.o. female here for a Medicare Wellness visit.     Past Medical, Surgical, and Family History reviewed and updated in chart.         HPI  Patient is here for wellness visit and follow-up  Blood pressure controlled taking meds as prescrbed  Hyperlipidemia does not want to take a statin  History of basal cell carcinoma allergy regularly  Osteoporosis has been off medication taking vitamin D and working out  Patient Care Team:  Jose Eduardo Romero MD as PCP - General  Jose Eduardo Romero MD as PCP - [employer] PCP     Review of Systems   Constitutional:  Negative for chills and fever.   HENT: Negative.     Respiratory: Negative.     Cardiovascular: Negative.    Gastrointestinal: Negative.  Negative for nausea and vomiting.   Endocrine: Negative.    Genitourinary: Negative.    Musculoskeletal: Negative.    Skin: Negative.  Negative for rash.   Allergic/Immunologic: Negative.    Neurological: Negative.    Hematological: Negative.    Psychiatric/Behavioral: Negative.     All other systems reviewed and are negative.      Objective   Vitals:  Blood Pressure 122/78   Pulse 73   Height 1.676 m (5' 6\")   Weight 63.5 kg (140 lb)   Body Mass Index 22.60 kg/m²       Physical Exam  Vitals and nursing note reviewed.   Constitutional:       Appearance: Normal appearance.   HENT:      Head: Normocephalic and atraumatic.      Right Ear: Tympanic membrane normal.      Left Ear: Tympanic membrane normal.      Nose: Nose normal.      Mouth/Throat:      Mouth: Mucous membranes are moist.   Eyes:      Pupils: Pupils are equal, round, and reactive to light.   Cardiovascular:      Rate and Rhythm: Normal rate and regular rhythm.      Pulses: Normal pulses.      Heart sounds: Normal heart sounds.   Pulmonary:      Effort: Pulmonary effort is normal.      Breath sounds: Normal breath sounds.   Abdominal:      General: Abdomen is flat. Bowel sounds are normal.      Palpations: " Abdomen is soft.   Musculoskeletal:         General: Normal range of motion.      Cervical back: Normal range of motion and neck supple.   Skin:     General: Skin is warm and dry.      Capillary Refill: Capillary refill takes less than 2 seconds.   Neurological:      General: No focal deficit present.      Mental Status: She is alert and oriented to person, place, and time.   Psychiatric:         Mood and Affect: Mood normal.         Assessment/Plan   Problem List Items Addressed This Visit       Benign essential hypertension    Current Assessment & Plan     Blood pressures controlled continue current medication.         Relevant Medications    amLODIPine-benazepriL (Lotrel) 5-10 mg capsule    Other Relevant Orders    Vitamin D 25-Hydroxy,Total (for eval of Vitamin D levels) (Completed)    TSH with reflex to Free T4 if abnormal (Completed)    Lipid Panel (Completed)    Comprehensive Metabolic Panel (Completed)    CBC (Completed)    Hypercholesterolemia    Current Assessment & Plan     Cholesterol remains elevated reluctant to start a statin         Relevant Orders    Vitamin D 25-Hydroxy,Total (for eval of Vitamin D levels) (Completed)    TSH with reflex to Free T4 if abnormal (Completed)    Lipid Panel (Completed)    Comprehensive Metabolic Panel (Completed)    CBC (Completed)    Osteoporosis    Current Assessment & Plan     Continue to monitor abdominal density continue calcium and vitamin D and weightbearing exercises         Relevant Orders    Vitamin D 25-Hydroxy,Total (for eval of Vitamin D levels) (Completed)    TSH with reflex to Free T4 if abnormal (Completed)    Lipid Panel (Completed)    Comprehensive Metabolic Panel (Completed)    CBC (Completed)    Routine general medical examination at health care facility - Primary

## 2023-10-18 ENCOUNTER — TELEPHONE (OUTPATIENT)
Dept: PRIMARY CARE | Facility: CLINIC | Age: 78
End: 2023-10-18
Payer: MEDICARE

## 2023-10-18 DIAGNOSIS — E78.00 HYPERCHOLESTEROLEMIA: ICD-10-CM

## 2023-10-18 RX ORDER — ATORVASTATIN CALCIUM 10 MG/1
10 TABLET, FILM COATED ORAL DAILY
Qty: 90 TABLET | Refills: 1 | Status: CANCELLED | OUTPATIENT
Start: 2023-10-18 | End: 2024-04-15

## 2023-10-18 NOTE — TELEPHONE ENCOUNTER
----- Message from Jose Eduardo Romero MD sent at 10/18/2023  7:49 AM EDT -----  Please call the patient regarding her abnormal result.  Cholesterol remains very high Recommend Atorvastatin 10mg daily for stroke prevention

## 2023-10-18 NOTE — TELEPHONE ENCOUNTER
Spoke wit patient, states she can not take the Atorvastin 10mg you prescribed her because she in taking Lotrel. Please advise.

## 2023-10-19 NOTE — TELEPHONE ENCOUNTER
Pt knows they are for to different diagnosis. Pt stating she will not take atorvastatin with the Lotrel due to is may cause issues after researching the 2 drugs together-kidney liver issues.      Is there something you want to put pt on? She would like to know she she can research medication before taking.    No

## 2023-10-22 PROBLEM — Z00.00 ROUTINE GENERAL MEDICAL EXAMINATION AT HEALTH CARE FACILITY: Status: ACTIVE | Noted: 2023-10-22

## 2023-10-22 ASSESSMENT — ENCOUNTER SYMPTOMS
ENDOCRINE NEGATIVE: 1
ALLERGIC/IMMUNOLOGIC NEGATIVE: 1
HEMATOLOGIC/LYMPHATIC NEGATIVE: 1
MUSCULOSKELETAL NEGATIVE: 1
CHILLS: 0
NAUSEA: 0
RESPIRATORY NEGATIVE: 1
GASTROINTESTINAL NEGATIVE: 1
FEVER: 0
VOMITING: 0
CARDIOVASCULAR NEGATIVE: 1
NEUROLOGICAL NEGATIVE: 1
PSYCHIATRIC NEGATIVE: 1

## 2024-04-04 ENCOUNTER — HOSPITAL ENCOUNTER (OUTPATIENT)
Dept: RADIOLOGY | Facility: CLINIC | Age: 79
Discharge: HOME | End: 2024-04-04
Payer: MEDICARE

## 2024-04-04 VITALS — HEIGHT: 66 IN | WEIGHT: 139.99 LBS | BODY MASS INDEX: 22.5 KG/M2

## 2024-04-04 DIAGNOSIS — Z12.31 ENCOUNTER FOR SCREENING MAMMOGRAM FOR MALIGNANT NEOPLASM OF BREAST: ICD-10-CM

## 2024-04-04 PROCEDURE — 77063 BREAST TOMOSYNTHESIS BI: CPT | Performed by: RADIOLOGY

## 2024-04-04 PROCEDURE — 77067 SCR MAMMO BI INCL CAD: CPT | Performed by: RADIOLOGY

## 2024-04-04 PROCEDURE — 77067 SCR MAMMO BI INCL CAD: CPT

## 2024-04-23 ENCOUNTER — LAB (OUTPATIENT)
Dept: LAB | Facility: LAB | Age: 79
End: 2024-04-23
Payer: MEDICARE

## 2024-04-23 ENCOUNTER — OFFICE VISIT (OUTPATIENT)
Dept: PRIMARY CARE | Facility: CLINIC | Age: 79
End: 2024-04-23
Payer: MEDICARE

## 2024-04-23 VITALS
BODY MASS INDEX: 22.18 KG/M2 | WEIGHT: 138 LBS | HEIGHT: 66 IN | OXYGEN SATURATION: 94 % | HEART RATE: 77 BPM | SYSTOLIC BLOOD PRESSURE: 157 MMHG | DIASTOLIC BLOOD PRESSURE: 79 MMHG | RESPIRATION RATE: 16 BRPM

## 2024-04-23 DIAGNOSIS — Z00.00 ROUTINE GENERAL MEDICAL EXAMINATION AT HEALTH CARE FACILITY: Primary | ICD-10-CM

## 2024-04-23 DIAGNOSIS — I10 BENIGN ESSENTIAL HYPERTENSION: ICD-10-CM

## 2024-04-23 DIAGNOSIS — E78.5 DYSLIPIDEMIA: ICD-10-CM

## 2024-04-23 DIAGNOSIS — M81.0 AGE-RELATED OSTEOPOROSIS WITHOUT CURRENT PATHOLOGICAL FRACTURE: ICD-10-CM

## 2024-04-23 PROBLEM — Z85.9 HISTORY OF MALIGNANT NEOPLASM: Status: ACTIVE | Noted: 2024-04-23

## 2024-04-23 PROBLEM — Z86.2 HISTORY OF ANEMIA: Status: ACTIVE | Noted: 2024-04-23

## 2024-04-23 PROBLEM — D64.9 ANEMIA: Status: ACTIVE | Noted: 2024-04-23

## 2024-04-23 PROBLEM — Z86.718 HISTORY OF THROMBOSIS: Status: ACTIVE | Noted: 2024-04-23

## 2024-04-23 PROBLEM — Z86.79 HISTORY OF HYPERTENSION: Status: ACTIVE | Noted: 2024-04-23

## 2024-04-23 LAB
ALBUMIN SERPL BCP-MCNC: 4.4 G/DL (ref 3.4–5)
ALP SERPL-CCNC: 61 U/L (ref 33–136)
ALT SERPL W P-5'-P-CCNC: 12 U/L (ref 7–45)
ANION GAP SERPL CALC-SCNC: 12 MMOL/L (ref 10–20)
AST SERPL W P-5'-P-CCNC: 23 U/L (ref 9–39)
BILIRUB SERPL-MCNC: 0.5 MG/DL (ref 0–1.2)
BUN SERPL-MCNC: 19 MG/DL (ref 6–23)
CALCIUM SERPL-MCNC: 10 MG/DL (ref 8.6–10.6)
CHLORIDE SERPL-SCNC: 105 MMOL/L (ref 98–107)
CHOLEST SERPL-MCNC: 225 MG/DL (ref 0–199)
CHOLESTEROL/HDL RATIO: 3.1
CO2 SERPL-SCNC: 31 MMOL/L (ref 21–32)
CREAT SERPL-MCNC: 0.68 MG/DL (ref 0.5–1.05)
EGFRCR SERPLBLD CKD-EPI 2021: 90 ML/MIN/1.73M*2
ERYTHROCYTE [DISTWIDTH] IN BLOOD BY AUTOMATED COUNT: 13.4 % (ref 11.5–14.5)
GLUCOSE SERPL-MCNC: 83 MG/DL (ref 74–99)
HCT VFR BLD AUTO: 41.5 % (ref 36–46)
HDLC SERPL-MCNC: 71.7 MG/DL
HGB BLD-MCNC: 13.1 G/DL (ref 12–16)
LDLC SERPL CALC-MCNC: 131 MG/DL
MCH RBC QN AUTO: 28.1 PG (ref 26–34)
MCHC RBC AUTO-ENTMCNC: 31.6 G/DL (ref 32–36)
MCV RBC AUTO: 89 FL (ref 80–100)
NON HDL CHOLESTEROL: 153 MG/DL (ref 0–149)
NRBC BLD-RTO: 0 /100 WBCS (ref 0–0)
PLATELET # BLD AUTO: 274 X10*3/UL (ref 150–450)
POTASSIUM SERPL-SCNC: 4.4 MMOL/L (ref 3.5–5.3)
PROT SERPL-MCNC: 7.3 G/DL (ref 6.4–8.2)
RBC # BLD AUTO: 4.67 X10*6/UL (ref 4–5.2)
SODIUM SERPL-SCNC: 144 MMOL/L (ref 136–145)
TRIGL SERPL-MCNC: 111 MG/DL (ref 0–149)
VLDL: 22 MG/DL (ref 0–40)
WBC # BLD AUTO: 6.7 X10*3/UL (ref 4.4–11.3)

## 2024-04-23 PROCEDURE — 3078F DIAST BP <80 MM HG: CPT | Performed by: FAMILY MEDICINE

## 2024-04-23 PROCEDURE — 80053 COMPREHEN METABOLIC PANEL: CPT

## 2024-04-23 PROCEDURE — 1160F RVW MEDS BY RX/DR IN RCRD: CPT | Performed by: FAMILY MEDICINE

## 2024-04-23 PROCEDURE — 85027 COMPLETE CBC AUTOMATED: CPT

## 2024-04-23 PROCEDURE — 1170F FXNL STATUS ASSESSED: CPT | Performed by: FAMILY MEDICINE

## 2024-04-23 PROCEDURE — 80061 LIPID PANEL: CPT

## 2024-04-23 PROCEDURE — 1159F MED LIST DOCD IN RCRD: CPT | Performed by: FAMILY MEDICINE

## 2024-04-23 PROCEDURE — 3077F SYST BP >= 140 MM HG: CPT | Performed by: FAMILY MEDICINE

## 2024-04-23 PROCEDURE — G0439 PPPS, SUBSEQ VISIT: HCPCS | Performed by: FAMILY MEDICINE

## 2024-04-23 PROCEDURE — 36415 COLL VENOUS BLD VENIPUNCTURE: CPT

## 2024-04-23 PROCEDURE — 1158F ADVNC CARE PLAN TLK DOCD: CPT | Performed by: FAMILY MEDICINE

## 2024-04-23 PROCEDURE — 1036F TOBACCO NON-USER: CPT | Performed by: FAMILY MEDICINE

## 2024-04-23 PROCEDURE — 1123F ACP DISCUSS/DSCN MKR DOCD: CPT | Performed by: FAMILY MEDICINE

## 2024-04-23 PROCEDURE — 99214 OFFICE O/P EST MOD 30 MIN: CPT | Performed by: FAMILY MEDICINE

## 2024-04-23 ASSESSMENT — ACTIVITIES OF DAILY LIVING (ADL)
DRESSING: INDEPENDENT
GROCERY_SHOPPING: INDEPENDENT
TAKING_MEDICATION: INDEPENDENT
MANAGING_FINANCES: INDEPENDENT
DOING_HOUSEWORK: INDEPENDENT
BATHING: INDEPENDENT

## 2024-04-23 ASSESSMENT — PATIENT HEALTH QUESTIONNAIRE - PHQ9
2. FEELING DOWN, DEPRESSED OR HOPELESS: NOT AT ALL
SUM OF ALL RESPONSES TO PHQ9 QUESTIONS 1 AND 2: 0
1. LITTLE INTEREST OR PLEASURE IN DOING THINGS: NOT AT ALL

## 2024-04-23 NOTE — PROGRESS NOTES
"Subjective   Reason for Visit: Sandrine Carter is an 78 y.o. female here for a Medicare Wellness visit.     Past Medical, Surgical, and Family History reviewed and updated in chart.    Reviewed all medications by prescribing practitioner or clinical pharmacist (such as prescriptions, OTCs, herbal therapies and supplements) and documented in the medical record.    HPI  Patient is here for wellness visit and follow-up  Blood pressure controlled taking meds as prescrbed  Hyperlipidemia does not want to take a statin  History of basal cell carcinoma Seeing dermatology   Osteoporosis has been off medication taking vitamin D and working out  Patient Care Team:  Jose Eduardo Romero MD as PCP - General  Jose Eduardo Romero MD as PCP - [employer]age PCP     Review of Systems   Constitutional:  Negative for chills and fever.   HENT: Negative.     Respiratory: Negative.     Cardiovascular: Negative.    Gastrointestinal: Negative.  Negative for nausea and vomiting.   Endocrine: Negative.    Genitourinary: Negative.    Musculoskeletal: Negative.    Skin: Negative.  Negative for rash.   Allergic/Immunologic: Negative.    Neurological: Negative.    Hematological: Negative.    Psychiatric/Behavioral: Negative.     All other systems reviewed and are negative.      Objective   Vitals:  /79   Pulse 77   Resp 16   Ht 1.676 m (5' 6\")   Wt 62.6 kg (138 lb)   SpO2 94%   BMI 22.27 kg/m²       Physical Exam  Vitals and nursing note reviewed.   Constitutional:       Appearance: Normal appearance.   HENT:      Head: Normocephalic and atraumatic.      Right Ear: Tympanic membrane normal.      Left Ear: Tympanic membrane normal.      Nose: Nose normal.      Mouth/Throat:      Mouth: Mucous membranes are moist.   Eyes:      Pupils: Pupils are equal, round, and reactive to light.   Cardiovascular:      Rate and Rhythm: Normal rate and regular rhythm.      Pulses: Normal pulses.      Heart sounds: Normal heart sounds.   Pulmonary:      " Effort: Pulmonary effort is normal.      Breath sounds: Normal breath sounds.   Abdominal:      General: Abdomen is flat. Bowel sounds are normal.      Palpations: Abdomen is soft.   Musculoskeletal:         General: Normal range of motion.      Cervical back: Normal range of motion and neck supple.   Skin:     General: Skin is warm and dry.      Capillary Refill: Capillary refill takes less than 2 seconds.   Neurological:      General: No focal deficit present.      Mental Status: She is alert and oriented to person, place, and time.   Psychiatric:         Mood and Affect: Mood normal.         Assessment/Plan   Problem List Items Addressed This Visit       Benign essential hypertension    Current Assessment & Plan     Blood pressures controlled continue current medication.Lotrel         Relevant Orders    Lipid Panel (Completed)    CBC (Completed)    Comprehensive Metabolic Panel (Completed)    Osteoporosis    Current Assessment & Plan     Bone Density testing shows Osteopenia (ie- low bone density). In 2023 Jame order Dexa next year   Aim for 3-4 servings of calcium containing foods daily (you can easily find a list of these foods on the internet).  VItamin D level goal is ~40-50, and usually requires supplementation. You MUST take vitamin D with a meal as it needs fat to be absorbed into the body.  Weight bearing exercise is important (walking, weights, yoga etc).  Bone Density testing is recommended every 2 years to monitor for bone loss.          Dyslipidemia    Current Assessment & Plan     Does not want statin  Continue healthy diet and exercise         Relevant Orders    Lipid Panel (Completed)    CBC (Completed)    Comprehensive Metabolic Panel (Completed)    Routine general medical examination at health care facility - Primary

## 2024-04-28 ASSESSMENT — EXTERNAL EXAM - RIGHT EYE: OD_EXAM: NORMAL

## 2024-04-28 ASSESSMENT — CUP TO DISC RATIO
OD_RATIO: .4
OS_RATIO: .4

## 2024-04-28 ASSESSMENT — EXTERNAL EXAM - LEFT EYE: OS_EXAM: NORMAL

## 2024-04-28 NOTE — PROGRESS NOTES
Glaucoma suspect of both eyesH40.003  -No FH of glaucoma  -She was started on Latanoprost at [facility], but no longer taking this.   -Last exam with [facility] (Dr. Lasha Ferraro) was 3/28/19, notes scanned into EMR.   -D/w patient risk of permanent vision loss with diagnosis of glaucoma.   -Pachymetry (10/3/22) - 586/588.  -OCT RNFL (4/29/24) - SS: 7/10 OU. OD: Bord I. OS: WNL. 79/88. Stable from 10/3/22 and 4/3/23.   -Patient with myopia and tilted discs, may be physiologic. Would recommend monitoring for now. No treatment necessary at this time. F/u 6-8 months - refract OU, glare/BAT OU, dilate OU, OCT RNFL OU at [facility] for possible YAG.    Posterior capsular opacification, right eye  Posterior capsular opacification, left eye  Pseudophakia of right eyeZ96.1 - 6/22/23  Pseudophakia of left eyeZ96.1 - 5/18/23  -Doing well. Good vision. IOP good. Off all gtts.   -History of when looks to far left, sees movement of light in periphery. Does not note in any other circumstances. May be positive dysphotopsia. Monitor for now.   -F/u 6-8 months - refract OU, glare/BAT OU, dilate OU, OCT RNFL OU at [facility] for possible YAG.    XgiuynqjbtdcucrS56.839  -Not significant at this time. Monitor.    Family history of macular yosesdolvughD33.518  -OCT macula (8/28/23) - SS: 8/10 OU. Normal thickness and contour OU. Intact IS-OS OU. No edema OU. 278/280. Stable from 4/3/23, better quality image today.   -(+)FH AMD - mother.  -Macular exam WNL. Follow with annual dilated exams.     Bilateral dlvzwsB14.13  Astigmatism  Presbyopia of both eyesH52.4  -Copy of Rx given   -F/u 6-8 months - refract OU, glare/BAT OU, dilate OU, OCT RNFL OU at [facility] for possible YAG.      No history of refractive surgery.   No FH of glaucoma      PAST OCULAR HISTORY:    Chalazion of right upper nahcntP96.11  Pyogenic granuloma of right dzjavffizqvY79.221  Cyst of right upper ewrmyvJ61.821  -Had cyst/involuted  chalazion of RUL present since November 2022. Chalazion central RUL with associated pyogenic granuloma with bleeding.   -Tried conservative management initially with warm compresses, maxitrol ophthalmic ointment, and doxycycline PO. Saw Dr. Olivas 3/22/23 and 4/19/23 - s/p right upper eyelid I&C with pyogenic granuloma excision. Appears resolved at this time.

## 2024-04-29 ENCOUNTER — OFFICE VISIT (OUTPATIENT)
Dept: OPHTHALMOLOGY | Facility: CLINIC | Age: 79
End: 2024-04-29
Payer: MEDICARE

## 2024-04-29 DIAGNOSIS — H26.491 PCO (POSTERIOR CAPSULAR OPACIFICATION), RIGHT: ICD-10-CM

## 2024-04-29 DIAGNOSIS — Z96.1 PSEUDOPHAKIA: ICD-10-CM

## 2024-04-29 DIAGNOSIS — H02.831 DERMATOCHALASIS OF BOTH UPPER EYELIDS: ICD-10-CM

## 2024-04-29 DIAGNOSIS — H02.834 DERMATOCHALASIS OF BOTH UPPER EYELIDS: ICD-10-CM

## 2024-04-29 DIAGNOSIS — H52.203 ASTIGMATISM OF BOTH EYES, UNSPECIFIED TYPE: ICD-10-CM

## 2024-04-29 DIAGNOSIS — H52.4 PRESBYOPIA: ICD-10-CM

## 2024-04-29 DIAGNOSIS — Z83.518 FAMILY HISTORY OF MACULAR DEGENERATION: ICD-10-CM

## 2024-04-29 DIAGNOSIS — H26.492 PCO (POSTERIOR CAPSULAR OPACIFICATION), LEFT: ICD-10-CM

## 2024-04-29 DIAGNOSIS — H52.13 MYOPIA OF BOTH EYES: ICD-10-CM

## 2024-04-29 DIAGNOSIS — H40.003 GLAUCOMA SUSPECT OF BOTH EYES: Primary | ICD-10-CM

## 2024-04-29 PROBLEM — D64.9 ANEMIA: Status: RESOLVED | Noted: 2024-04-23 | Resolved: 2024-04-29

## 2024-04-29 PROBLEM — E16.9: Status: RESOLVED | Noted: 2018-02-28 | Resolved: 2024-04-29

## 2024-04-29 PROBLEM — Z86.2 HISTORY OF ANEMIA: Status: RESOLVED | Noted: 2024-04-23 | Resolved: 2024-04-29

## 2024-04-29 PROBLEM — Z86.718 HISTORY OF THROMBOSIS: Status: RESOLVED | Noted: 2024-04-23 | Resolved: 2024-04-29

## 2024-04-29 PROBLEM — Z86.79 HISTORY OF HYPERTENSION: Status: RESOLVED | Noted: 2024-04-23 | Resolved: 2024-04-29

## 2024-04-29 PROBLEM — Z85.9 HISTORY OF MALIGNANT NEOPLASM: Status: RESOLVED | Noted: 2024-04-23 | Resolved: 2024-04-29

## 2024-04-29 PROBLEM — E78.00 HYPERCHOLESTEROLEMIA: Status: RESOLVED | Noted: 2023-02-05 | Resolved: 2024-04-29

## 2024-04-29 PROCEDURE — 92014 COMPRE OPH EXAM EST PT 1/>: CPT | Performed by: OPHTHALMOLOGY

## 2024-04-29 PROCEDURE — 92133 CPTRZD OPH DX IMG PST SGM ON: CPT | Performed by: OPHTHALMOLOGY

## 2024-04-29 ASSESSMENT — TONOMETRY
OS_IOP_MMHG: 15
OD_IOP_MMHG: 13
IOP_METHOD: GOLDMANN APPLANATION

## 2024-04-29 ASSESSMENT — REFRACTION_WEARINGRX
OS_ADD: +2.50
OS_AXIS: 160
OD_ADD: +2.50
OD_CYLINDER: -2.00
OS_CYLINDER: -1.25
OS_SPHERE: -1.75
OD_AXIS: 165
OD_SPHERE: -2.75

## 2024-04-29 ASSESSMENT — ENCOUNTER SYMPTOMS
ENDOCRINE NEGATIVE: 0
ENDOCRINE NEGATIVE: 1
CARDIOVASCULAR NEGATIVE: 1
RESPIRATORY NEGATIVE: 1
CHILLS: 0
CARDIOVASCULAR NEGATIVE: 0
GASTROINTESTINAL NEGATIVE: 0
HEMATOLOGIC/LYMPHATIC NEGATIVE: 1
CONSTITUTIONAL NEGATIVE: 0
MUSCULOSKELETAL NEGATIVE: 1
RESPIRATORY NEGATIVE: 0
NAUSEA: 0
EYES NEGATIVE: 1
GASTROINTESTINAL NEGATIVE: 1
HEMATOLOGIC/LYMPHATIC NEGATIVE: 0
VOMITING: 0
ALLERGIC/IMMUNOLOGIC NEGATIVE: 0
MUSCULOSKELETAL NEGATIVE: 0
PSYCHIATRIC NEGATIVE: 0
ALLERGIC/IMMUNOLOGIC NEGATIVE: 1
FEVER: 0
NEUROLOGICAL NEGATIVE: 0
NEUROLOGICAL NEGATIVE: 1
PSYCHIATRIC NEGATIVE: 1

## 2024-04-29 ASSESSMENT — PACHYMETRY
OS_CT(UM): 588
OD_CT(UM): 586

## 2024-04-29 ASSESSMENT — REFRACTION_MANIFEST
OS_CYLINDER: -1.00
OD_ADD: +2.50
OS_ADD: +2.50
OD_CYLINDER: -2.25
OS_SPHERE: -1.50
OD_AXIS: 165
OD_SPHERE: -3.00
OS_AXIS: 160

## 2024-04-29 ASSESSMENT — CONF VISUAL FIELD
OD_SUPERIOR_TEMPORAL_RESTRICTION: 0
OS_INFERIOR_NASAL_RESTRICTION: 0
OD_SUPERIOR_NASAL_RESTRICTION: 0
OS_INFERIOR_TEMPORAL_RESTRICTION: 0
OD_INFERIOR_NASAL_RESTRICTION: 0
OS_SUPERIOR_TEMPORAL_RESTRICTION: 0
OS_NORMAL: 1
OD_NORMAL: 1
OS_SUPERIOR_NASAL_RESTRICTION: 0
OD_INFERIOR_TEMPORAL_RESTRICTION: 0

## 2024-04-29 ASSESSMENT — VISUAL ACUITY
OD_CC: 20/30
CORRECTION_TYPE: GLASSES
OS_CC+: +2
METHOD: SNELLEN - LINEAR
OD_CC+: -1
OS_CC: 20/30

## 2024-04-30 NOTE — ASSESSMENT & PLAN NOTE
Bone Density testing shows Osteopenia (ie- low bone density). In 2023 Jame order Dexa next year   Aim for 3-4 servings of calcium containing foods daily (you can easily find a list of these foods on the internet).  VItamin D level goal is ~40-50, and usually requires supplementation. You MUST take vitamin D with a meal as it needs fat to be absorbed into the body.  Weight bearing exercise is important (walking, weights, yoga etc).  Bone Density testing is recommended every 2 years to monitor for bone loss.

## 2024-05-10 ENCOUNTER — DOCUMENTATION (OUTPATIENT)
Dept: UROLOGY | Facility: CLINIC | Age: 79
End: 2024-05-10
Payer: MEDICARE

## 2024-06-02 DIAGNOSIS — I10 BENIGN ESSENTIAL HYPERTENSION: ICD-10-CM

## 2024-06-03 RX ORDER — AMLODIPINE AND BENAZEPRIL HYDROCHLORIDE 5; 10 MG/1; MG/1
1 CAPSULE ORAL DAILY
Qty: 90 CAPSULE | Refills: 1 | Status: SHIPPED | OUTPATIENT
Start: 2024-06-03

## 2024-08-26 ENCOUNTER — LAB REQUISITION (OUTPATIENT)
Dept: LAB | Facility: HOSPITAL | Age: 79
End: 2024-08-26
Payer: MEDICARE

## 2024-08-26 DIAGNOSIS — N39.0 URINARY TRACT INFECTION, SITE NOT SPECIFIED: ICD-10-CM

## 2024-08-26 PROCEDURE — 87086 URINE CULTURE/COLONY COUNT: CPT

## 2024-08-26 PROCEDURE — 87186 SC STD MICRODIL/AGAR DIL: CPT

## 2024-08-29 LAB — BACTERIA UR CULT: ABNORMAL

## 2024-10-29 ENCOUNTER — LAB (OUTPATIENT)
Dept: LAB | Facility: LAB | Age: 79
End: 2024-10-29
Payer: MEDICARE

## 2024-10-29 ENCOUNTER — APPOINTMENT (OUTPATIENT)
Dept: PRIMARY CARE | Facility: CLINIC | Age: 79
End: 2024-10-29
Payer: MEDICARE

## 2024-10-29 VITALS
DIASTOLIC BLOOD PRESSURE: 78 MMHG | BODY MASS INDEX: 21.98 KG/M2 | SYSTOLIC BLOOD PRESSURE: 124 MMHG | HEART RATE: 80 BPM | HEIGHT: 66 IN | WEIGHT: 136.8 LBS

## 2024-10-29 DIAGNOSIS — E78.5 DYSLIPIDEMIA: ICD-10-CM

## 2024-10-29 DIAGNOSIS — M81.0 AGE-RELATED OSTEOPOROSIS WITHOUT CURRENT PATHOLOGICAL FRACTURE: ICD-10-CM

## 2024-10-29 DIAGNOSIS — I10 BENIGN ESSENTIAL HYPERTENSION: ICD-10-CM

## 2024-10-29 DIAGNOSIS — Z00.00 ROUTINE GENERAL MEDICAL EXAMINATION AT A HEALTH CARE FACILITY: Primary | ICD-10-CM

## 2024-10-29 DIAGNOSIS — Z23 NEEDS FLU SHOT: ICD-10-CM

## 2024-10-29 DIAGNOSIS — R10.13 EPIGASTRIC PAIN: ICD-10-CM

## 2024-10-29 DIAGNOSIS — Z00.00 ROUTINE GENERAL MEDICAL EXAMINATION AT A HEALTH CARE FACILITY: ICD-10-CM

## 2024-10-29 LAB
25(OH)D3 SERPL-MCNC: 44 NG/ML (ref 30–100)
ALBUMIN SERPL BCP-MCNC: 4.5 G/DL (ref 3.4–5)
ALP SERPL-CCNC: 61 U/L (ref 33–136)
ALT SERPL W P-5'-P-CCNC: 12 U/L (ref 7–45)
ANION GAP SERPL CALC-SCNC: 10 MMOL/L (ref 10–20)
AST SERPL W P-5'-P-CCNC: 21 U/L (ref 9–39)
BASOPHILS # BLD AUTO: 0.11 X10*3/UL (ref 0–0.1)
BASOPHILS NFR BLD AUTO: 1.9 %
BILIRUB SERPL-MCNC: 0.6 MG/DL (ref 0–1.2)
BUN SERPL-MCNC: 12 MG/DL (ref 6–23)
CALCIUM SERPL-MCNC: 9.5 MG/DL (ref 8.6–10.6)
CHLORIDE SERPL-SCNC: 105 MMOL/L (ref 98–107)
CHOLEST SERPL-MCNC: 229 MG/DL (ref 0–199)
CHOLESTEROL/HDL RATIO: 3.5
CO2 SERPL-SCNC: 32 MMOL/L (ref 21–32)
CREAT SERPL-MCNC: 0.66 MG/DL (ref 0.5–1.05)
EGFRCR SERPLBLD CKD-EPI 2021: 90 ML/MIN/1.73M*2
EOSINOPHIL # BLD AUTO: 0.11 X10*3/UL (ref 0–0.4)
EOSINOPHIL NFR BLD AUTO: 1.9 %
ERYTHROCYTE [DISTWIDTH] IN BLOOD BY AUTOMATED COUNT: 13.2 % (ref 11.5–14.5)
GLUCOSE SERPL-MCNC: 87 MG/DL (ref 74–99)
HCT VFR BLD AUTO: 41.3 % (ref 36–46)
HDLC SERPL-MCNC: 64.6 MG/DL
HGB BLD-MCNC: 13.1 G/DL (ref 12–16)
IMM GRANULOCYTES # BLD AUTO: 0.01 X10*3/UL (ref 0–0.5)
IMM GRANULOCYTES NFR BLD AUTO: 0.2 % (ref 0–0.9)
LDLC SERPL CALC-MCNC: 147 MG/DL
LYMPHOCYTES # BLD AUTO: 2.06 X10*3/UL (ref 0.8–3)
LYMPHOCYTES NFR BLD AUTO: 35.3 %
MCH RBC QN AUTO: 27.9 PG (ref 26–34)
MCHC RBC AUTO-ENTMCNC: 31.7 G/DL (ref 32–36)
MCV RBC AUTO: 88 FL (ref 80–100)
MONOCYTES # BLD AUTO: 0.43 X10*3/UL (ref 0.05–0.8)
MONOCYTES NFR BLD AUTO: 7.4 %
NEUTROPHILS # BLD AUTO: 3.12 X10*3/UL (ref 1.6–5.5)
NEUTROPHILS NFR BLD AUTO: 53.3 %
NON HDL CHOLESTEROL: 164 MG/DL (ref 0–149)
NRBC BLD-RTO: 0 /100 WBCS (ref 0–0)
PLATELET # BLD AUTO: 290 X10*3/UL (ref 150–450)
POTASSIUM SERPL-SCNC: 4.1 MMOL/L (ref 3.5–5.3)
PROT SERPL-MCNC: 7.2 G/DL (ref 6.4–8.2)
RBC # BLD AUTO: 4.69 X10*6/UL (ref 4–5.2)
SODIUM SERPL-SCNC: 143 MMOL/L (ref 136–145)
TRIGL SERPL-MCNC: 89 MG/DL (ref 0–149)
TSH SERPL-ACNC: 2.68 MIU/L (ref 0.44–3.98)
VIT B12 SERPL-MCNC: 735 PG/ML (ref 211–911)
VLDL: 18 MG/DL (ref 0–40)
WBC # BLD AUTO: 5.8 X10*3/UL (ref 4.4–11.3)

## 2024-10-29 PROCEDURE — 1159F MED LIST DOCD IN RCRD: CPT | Performed by: FAMILY MEDICINE

## 2024-10-29 PROCEDURE — 36415 COLL VENOUS BLD VENIPUNCTURE: CPT

## 2024-10-29 PROCEDURE — 80061 LIPID PANEL: CPT

## 2024-10-29 PROCEDURE — 3074F SYST BP LT 130 MM HG: CPT | Performed by: FAMILY MEDICINE

## 2024-10-29 PROCEDURE — 82306 VITAMIN D 25 HYDROXY: CPT

## 2024-10-29 PROCEDURE — 85025 COMPLETE CBC W/AUTO DIFF WBC: CPT

## 2024-10-29 PROCEDURE — 1123F ACP DISCUSS/DSCN MKR DOCD: CPT | Performed by: FAMILY MEDICINE

## 2024-10-29 PROCEDURE — 82607 VITAMIN B-12: CPT

## 2024-10-29 PROCEDURE — 80053 COMPREHEN METABOLIC PANEL: CPT

## 2024-10-29 PROCEDURE — 84443 ASSAY THYROID STIM HORMONE: CPT

## 2024-10-29 PROCEDURE — 99397 PER PM REEVAL EST PAT 65+ YR: CPT | Performed by: FAMILY MEDICINE

## 2024-10-29 PROCEDURE — 3078F DIAST BP <80 MM HG: CPT | Performed by: FAMILY MEDICINE

## 2024-10-29 PROCEDURE — 99214 OFFICE O/P EST MOD 30 MIN: CPT | Performed by: FAMILY MEDICINE

## 2024-10-29 PROCEDURE — 1036F TOBACCO NON-USER: CPT | Performed by: FAMILY MEDICINE

## 2024-10-29 RX ORDER — AMLODIPINE AND BENAZEPRIL HYDROCHLORIDE 5; 10 MG/1; MG/1
1 CAPSULE ORAL DAILY
Qty: 90 CAPSULE | Refills: 1 | Status: SHIPPED | OUTPATIENT
Start: 2024-10-29

## 2024-10-29 ASSESSMENT — ENCOUNTER SYMPTOMS
LOSS OF SENSATION IN FEET: 0
OCCASIONAL FEELINGS OF UNSTEADINESS: 0
DEPRESSION: 0

## 2024-10-30 DIAGNOSIS — D72.824 BASOPHILIA: Primary | ICD-10-CM

## 2024-11-12 ENCOUNTER — APPOINTMENT (OUTPATIENT)
Dept: OPHTHALMOLOGY | Facility: CLINIC | Age: 79
End: 2024-11-12
Payer: MEDICARE

## 2024-12-07 ASSESSMENT — EXTERNAL EXAM - LEFT EYE: OS_EXAM: NORMAL

## 2024-12-07 ASSESSMENT — CUP TO DISC RATIO
OD_RATIO: .4
OS_RATIO: .4

## 2024-12-07 ASSESSMENT — EXTERNAL EXAM - RIGHT EYE: OD_EXAM: NORMAL

## 2024-12-08 NOTE — PROGRESS NOTES
Glaucoma suspect of both eyesH40.003  -No FH of glaucoma  -She was started on Latanoprost at [facility], but no longer taking this.   -Last exam with [facility] (Dr. Lasha Ferraro) was 3/28/19, notes scanned into EMR.   -D/w patient risk of permanent vision loss with diagnosis of glaucoma.   -Pachymetry (10/3/22) - 586/588.  -OCT RNFL (12/9/24) - SS: 6/10 OU. OD: Thin I. OS: Bord N. 77/79. Stable OU compared to 4/3/23. Stable OD compared to 10/3/22.   -Patient with myopia and tilted discs, may be physiologic. Would recommend monitoring for now. No treatment necessary at this time. F/u 12 months - refract OU, glare/BAT OU, dilate OU, OCT RNFL OU.    Posterior capsular opacification, right eye  Posterior capsular opacification, left eye  Pseudophakia of right eyeZ96.1 - 6/22/23  Pseudophakia of left eyeZ96.1 - 5/18/23  -Doing well. Good vision. IOP good. Off all gtts.   -History of when looks to far left, sees movement of light in periphery. Does not note in any other circumstances. May be positive dysphotopsia. Monitor for now.   -F/u 12 months - refract OU, glare/BAT OU, dilate OU, OCT RNFL OU. May call/return sooner if vision worsens and interested in YAG laser capsulotomy.     HdstebzzqdgupgpR65.839  -Not significant at this time. Monitor.  -History of dryness/flaking of eyelid ([employer] message from 9/2024) - used Aquaphor and symptoms resolved. Also decreased frequency of baby shampoo eyelid wash. May use PRN.     Family history of macular sjvhbebhyrfzG73.518  -OCT macula (8/28/23) - SS: 8/10 OU. Normal thickness and contour OU. Intact IS-OS OU. No edema OU. 278/280. Stable from 4/3/23, better quality image today.   -(+)FH AMD - mother.  -Macular exam WNL. Follow with annual dilated exams.     Bilateral ibnjrlO78.13  Astigmatism  Presbyopia of both eyesH52.4  -New Rx for glasses given per patient request. Patient's signature obtained to acknowledge and confirm that a paper copy of glasses  Rx was given to patient in compliance with [facility]eglass Rule. Electronic copy of Rx will also be available via [employer].   -F/u 12 months - refract OU, glare/BAT OU, dilate OU, OCT RNFL OU.      No history of refractive surgery.   No FH of glaucoma      PAST OCULAR HISTORY:    Chalazion of right upper ducoqfD28.11  Pyogenic granuloma of right wjywsbcnohaP06.221  Cyst of right upper hvwdnyB38.821  -Had cyst/involuted chalazion of RUL present since November 2022. Chalazion central RUL with associated pyogenic granuloma with bleeding.   -Tried conservative management initially with warm compresses, maxitrol ophthalmic ointment, and doxycycline PO. Saw Dr. Olivas 3/22/23 and 4/19/23 - s/p right upper eyelid I&C with pyogenic granuloma excision. Appears resolved at this time.

## 2024-12-09 ENCOUNTER — LAB (OUTPATIENT)
Dept: LAB | Facility: LAB | Age: 79
End: 2024-12-09
Payer: MEDICARE

## 2024-12-09 ENCOUNTER — APPOINTMENT (OUTPATIENT)
Dept: OPHTHALMOLOGY | Facility: CLINIC | Age: 79
End: 2024-12-09
Payer: MEDICARE

## 2024-12-09 DIAGNOSIS — D72.824 BASOPHILIA: ICD-10-CM

## 2024-12-09 DIAGNOSIS — H26.492 PCO (POSTERIOR CAPSULAR OPACIFICATION), LEFT: ICD-10-CM

## 2024-12-09 DIAGNOSIS — H40.003 GLAUCOMA SUSPECT OF BOTH EYES: Primary | ICD-10-CM

## 2024-12-09 DIAGNOSIS — H52.13 MYOPIA OF BOTH EYES: ICD-10-CM

## 2024-12-09 DIAGNOSIS — H26.491 PCO (POSTERIOR CAPSULAR OPACIFICATION), RIGHT: ICD-10-CM

## 2024-12-09 DIAGNOSIS — H02.834 DERMATOCHALASIS OF BOTH UPPER EYELIDS: ICD-10-CM

## 2024-12-09 DIAGNOSIS — H52.4 PRESBYOPIA: ICD-10-CM

## 2024-12-09 DIAGNOSIS — H52.203 ASTIGMATISM OF BOTH EYES, UNSPECIFIED TYPE: ICD-10-CM

## 2024-12-09 DIAGNOSIS — Z83.518 FAMILY HISTORY OF MACULAR DEGENERATION: ICD-10-CM

## 2024-12-09 DIAGNOSIS — H02.831 DERMATOCHALASIS OF BOTH UPPER EYELIDS: ICD-10-CM

## 2024-12-09 DIAGNOSIS — Z96.1 PSEUDOPHAKIA: ICD-10-CM

## 2024-12-09 LAB
BASOPHILS # BLD AUTO: 0.15 X10*3/UL (ref 0–0.1)
BASOPHILS NFR BLD AUTO: 2.2 %
EOSINOPHIL # BLD AUTO: 0.1 X10*3/UL (ref 0–0.4)
EOSINOPHIL NFR BLD AUTO: 1.5 %
ERYTHROCYTE [DISTWIDTH] IN BLOOD BY AUTOMATED COUNT: 13.2 % (ref 11.5–14.5)
HCT VFR BLD AUTO: 41.5 % (ref 36–46)
HGB BLD-MCNC: 12.9 G/DL (ref 12–16)
IMM GRANULOCYTES # BLD AUTO: 0.02 X10*3/UL (ref 0–0.5)
IMM GRANULOCYTES NFR BLD AUTO: 0.3 % (ref 0–0.9)
LYMPHOCYTES # BLD AUTO: 2.14 X10*3/UL (ref 0.8–3)
LYMPHOCYTES NFR BLD AUTO: 31.7 %
MCH RBC QN AUTO: 27.4 PG (ref 26–34)
MCHC RBC AUTO-ENTMCNC: 31.1 G/DL (ref 32–36)
MCV RBC AUTO: 88 FL (ref 80–100)
MONOCYTES # BLD AUTO: 0.53 X10*3/UL (ref 0.05–0.8)
MONOCYTES NFR BLD AUTO: 7.8 %
NEUTROPHILS # BLD AUTO: 3.82 X10*3/UL (ref 1.6–5.5)
NEUTROPHILS NFR BLD AUTO: 56.5 %
NRBC BLD-RTO: 0 /100 WBCS (ref 0–0)
PLATELET # BLD AUTO: 276 X10*3/UL (ref 150–450)
RBC # BLD AUTO: 4.71 X10*6/UL (ref 4–5.2)
WBC # BLD AUTO: 6.8 X10*3/UL (ref 4.4–11.3)

## 2024-12-09 PROCEDURE — 92014 COMPRE OPH EXAM EST PT 1/>: CPT | Performed by: OPHTHALMOLOGY

## 2024-12-09 PROCEDURE — 92015 DETERMINE REFRACTIVE STATE: CPT | Performed by: OPHTHALMOLOGY

## 2024-12-09 PROCEDURE — 92133 CPTRZD OPH DX IMG PST SGM ON: CPT | Performed by: OPHTHALMOLOGY

## 2024-12-09 PROCEDURE — 36415 COLL VENOUS BLD VENIPUNCTURE: CPT

## 2024-12-09 PROCEDURE — 85025 COMPLETE CBC W/AUTO DIFF WBC: CPT

## 2024-12-09 ASSESSMENT — PACHYMETRY
OD_CT(UM): 586
OS_CT(UM): 588

## 2024-12-09 ASSESSMENT — ENCOUNTER SYMPTOMS
GASTROINTESTINAL NEGATIVE: 0
CARDIOVASCULAR NEGATIVE: 0
HEMATOLOGIC/LYMPHATIC NEGATIVE: 0
NEUROLOGICAL NEGATIVE: 0
ENDOCRINE NEGATIVE: 0
ALLERGIC/IMMUNOLOGIC NEGATIVE: 0
CONSTITUTIONAL NEGATIVE: 0
MUSCULOSKELETAL NEGATIVE: 0
PSYCHIATRIC NEGATIVE: 0
EYES NEGATIVE: 1
RESPIRATORY NEGATIVE: 0

## 2024-12-09 ASSESSMENT — REFRACTION_MANIFEST
OD_ADD: +2.50
OD_SPHERE: -3.00
OS_AXIS: 165
OD_CYLINDER: -1.75
OS_ADD: +2.50
OS_CYLINDER: -1.25
OS_SPHERE: -1.75
OD_AXIS: 170

## 2024-12-09 ASSESSMENT — REFRACTION_WEARINGRX
OS_ADD: +2.50
OS_AXIS: 165
OS_CYLINDER: -1.25
OD_SPHERE: -3.00
OD_CYLINDER: -1.75
OD_AXIS: 170
OD_ADD: +2.50
OS_SPHERE: -1.75

## 2024-12-09 ASSESSMENT — TONOMETRY
OD_IOP_MMHG: 14
IOP_METHOD: GOLDMANN APPLANATION
OS_IOP_MMHG: 14

## 2024-12-09 ASSESSMENT — VISUAL ACUITY
METHOD: SNELLEN - LINEAR
OS_CC: 20/25+
OD_BAT_MED: 20/25
OD_CC: 20/25+
OS_BAT_MED: 20/25
CORRECTION_TYPE: GLASSES

## 2024-12-12 ENCOUNTER — TELEPHONE (OUTPATIENT)
Dept: PRIMARY CARE | Facility: CLINIC | Age: 79
End: 2024-12-12

## 2024-12-17 ENCOUNTER — APPOINTMENT (OUTPATIENT)
Dept: OPHTHALMOLOGY | Facility: CLINIC | Age: 79
End: 2024-12-17
Payer: MEDICARE

## 2024-12-27 ENCOUNTER — APPOINTMENT (OUTPATIENT)
Dept: OPHTHALMOLOGY | Facility: CLINIC | Age: 79
End: 2024-12-27
Payer: MEDICARE

## 2025-04-07 ENCOUNTER — HOSPITAL ENCOUNTER (OUTPATIENT)
Dept: RADIOLOGY | Facility: CLINIC | Age: 80
Discharge: HOME | End: 2025-04-07
Payer: MEDICARE

## 2025-04-07 VITALS — WEIGHT: 137 LBS | BODY MASS INDEX: 22.11 KG/M2

## 2025-04-07 DIAGNOSIS — Z12.31 ENCOUNTER FOR SCREENING MAMMOGRAM FOR MALIGNANT NEOPLASM OF BREAST: ICD-10-CM

## 2025-04-07 PROCEDURE — 77063 BREAST TOMOSYNTHESIS BI: CPT

## 2025-04-07 PROCEDURE — 77063 BREAST TOMOSYNTHESIS BI: CPT | Performed by: RADIOLOGY

## 2025-04-07 PROCEDURE — 77067 SCR MAMMO BI INCL CAD: CPT | Performed by: RADIOLOGY

## 2025-04-15 DIAGNOSIS — I10 BENIGN ESSENTIAL HYPERTENSION: ICD-10-CM

## 2025-04-15 RX ORDER — AMLODIPINE AND BENAZEPRIL HYDROCHLORIDE 5; 10 MG/1; MG/1
1 CAPSULE ORAL DAILY
Qty: 90 CAPSULE | Refills: 1 | Status: SHIPPED | OUTPATIENT
Start: 2025-04-15

## 2025-04-16 ENCOUNTER — TELEPHONE (OUTPATIENT)
Dept: PRIMARY CARE | Facility: CLINIC | Age: 80
End: 2025-04-16
Payer: MEDICARE

## 2025-04-29 ENCOUNTER — APPOINTMENT (OUTPATIENT)
Dept: PRIMARY CARE | Facility: CLINIC | Age: 80
End: 2025-04-29
Payer: MEDICARE

## 2025-04-29 VITALS
BODY MASS INDEX: 22.18 KG/M2 | HEIGHT: 66 IN | DIASTOLIC BLOOD PRESSURE: 78 MMHG | SYSTOLIC BLOOD PRESSURE: 127 MMHG | WEIGHT: 138 LBS | HEART RATE: 77 BPM

## 2025-04-29 DIAGNOSIS — M85.80 OSTEOPENIA, UNSPECIFIED LOCATION: ICD-10-CM

## 2025-04-29 DIAGNOSIS — M81.0 AGE-RELATED OSTEOPOROSIS WITHOUT CURRENT PATHOLOGICAL FRACTURE: ICD-10-CM

## 2025-04-29 DIAGNOSIS — I10 BENIGN ESSENTIAL HYPERTENSION: ICD-10-CM

## 2025-04-29 DIAGNOSIS — Z00.00 ROUTINE GENERAL MEDICAL EXAMINATION AT HEALTH CARE FACILITY: Primary | ICD-10-CM

## 2025-04-29 DIAGNOSIS — Z78.0 POSTMENOPAUSAL: ICD-10-CM

## 2025-04-29 DIAGNOSIS — E78.5 DYSLIPIDEMIA: ICD-10-CM

## 2025-04-29 PROBLEM — H40.009 GLAUCOMA SUSPECT: Status: ACTIVE | Noted: 2022-04-01

## 2025-04-29 ASSESSMENT — PATIENT HEALTH QUESTIONNAIRE - PHQ9
1. LITTLE INTEREST OR PLEASURE IN DOING THINGS: NOT AT ALL
SUM OF ALL RESPONSES TO PHQ9 QUESTIONS 1 AND 2: 0
2. FEELING DOWN, DEPRESSED OR HOPELESS: NOT AT ALL

## 2025-04-29 ASSESSMENT — ENCOUNTER SYMPTOMS
LOSS OF SENSATION IN FEET: 0
DEPRESSION: 0
OCCASIONAL FEELINGS OF UNSTEADINESS: 0

## 2025-04-29 ASSESSMENT — ACTIVITIES OF DAILY LIVING (ADL)
MANAGING_FINANCES: INDEPENDENT
TAKING_MEDICATION: INDEPENDENT
DOING_HOUSEWORK: INDEPENDENT
DRESSING: INDEPENDENT
GROCERY_SHOPPING: INDEPENDENT
BATHING: INDEPENDENT

## 2025-04-29 NOTE — PROGRESS NOTES
"Subjective   Reason for Visit: Sandrine Carter is an 79 y.o. female here for a Medicare Wellness visit.     Past Medical, Surgical, and Family History reviewed and updated in chart.    Reviewed all medications by prescribing practitioner or clinical pharmacist (such as prescriptions, OTCs, herbal therapies and supplements) and documented in the medical record.    HPI  Patient is here for wellness visit and follow-up  Blood pressure controlled taking meds as prescrbed  Hyperlipidemia does not want to take a statin  History of basal cell carcinoma Seeing dermatology   Osteoporosis has been off medication taking vitamin D and working out  Basophilia was seen by hematology workup negative and observation recommended  Patient Care Team:  Jose Eduardo Romero MD as PCP - General  Jose Eduardo Romero MD as PCP - [employer] PCP     Review of Systems   Constitutional:  Negative for chills and fever.   HENT: Negative.     Respiratory: Negative.     Cardiovascular: Negative.    Gastrointestinal: Negative.  Negative for nausea and vomiting.   Endocrine: Negative.    Genitourinary: Negative.    Musculoskeletal: Negative.    Skin: Negative.  Negative for rash.   Allergic/Immunologic: Negative.    Neurological: Negative.    Hematological: Negative.    Psychiatric/Behavioral: Negative.     All other systems reviewed and are negative.      Objective   Vitals:  /78   Pulse 77   Ht 1.676 m (5' 6\")   Wt 62.6 kg (138 lb)   LMP  (LMP Unknown)   BMI 22.27 kg/m²       Physical Exam  Vitals and nursing note reviewed.   Constitutional:       Appearance: Normal appearance.   HENT:      Head: Normocephalic and atraumatic.      Right Ear: Tympanic membrane normal.      Left Ear: Tympanic membrane normal.      Nose: Nose normal.      Mouth/Throat:      Mouth: Mucous membranes are moist.   Eyes:      Pupils: Pupils are equal, round, and reactive to light.   Cardiovascular:      Rate and Rhythm: Normal rate and regular rhythm.      " Pulses: Normal pulses.      Heart sounds: Normal heart sounds.   Pulmonary:      Effort: Pulmonary effort is normal.      Breath sounds: Normal breath sounds.   Abdominal:      General: Abdomen is flat. Bowel sounds are normal.      Palpations: Abdomen is soft.   Musculoskeletal:         General: Normal range of motion.      Cervical back: Normal range of motion and neck supple.   Skin:     General: Skin is warm and dry.      Capillary Refill: Capillary refill takes less than 2 seconds.   Neurological:      General: No focal deficit present.      Mental Status: She is alert and oriented to person, place, and time.   Psychiatric:         Mood and Affect: Mood normal.         Assessment/Plan   Problem List Items Addressed This Visit       Benign essential hypertension    Current Assessment & Plan   Blood pressures controlled continue current medication.Lotrel  What is High Blood Pressure? High blood pressure (also called hypertension) is when your blood moves through your arteries at a higher pressure than normal and that forces your heart to work harder to pump the blood.     What are the Complications of High Blood Pressure? When your blood pressure gets too high or stays high for a long time, it can cause health problems such as:    Kidney disease or kidney failure   Heart attack and heart failure   Stroke   Vision problems   Circulation problems, poor blood supply to the legs    what are the causes of High Blood Pressure? There are many different causes and your provider can help you find out what might be causing your pressure to be too high.     What are the Signs of High Blood Pressure? High blood pressure doesn’t usually have warning signs or symptoms, so many people don’t realize they have it and that is why regular monitoring is important.     Prevention and Treatment: Often high blood pressure can be controlled with lifestyle changes and when necessary, medications. Adopting heart healthy habits can help:       Maintain a Healthy Weight   Eat a heart healthy diet full of vegetables, fruits and whole grains that are high in fiber   Be Physically Active every day   Find heart healthy ways to reduce stress and increase relaxation    Don’t use tobacco products   Limit your intake of alcohol, caffeine and salt   Monitor your blood pressure regularly: ask your provider how often   Take your medications as prescribed, even when you’re feeling well   Continue lisinopril at the current dose         Osteoporosis    Current Assessment & Plan   Bone Density testing shows Osteopenia (ie- low bone density). In 2023 Jame order Dexa next year   Aim for 3-4 servings of calcium containing foods daily (you can easily find a list of these foods on the internet).  VItamin D level goal is ~40-50, and usually requires supplementation. You MUST take vitamin D with a meal as it needs fat to be absorbed into the body.  Weight bearing exercise is important (walking, weights, yoga etc).  Bone Density testing is recommended every 2 years to monitor for bone loss.          Dyslipidemia    Current Assessment & Plan     Orders:    Lipid Panel           Relevant Orders    Lipid Panel (Completed)    Routine general medical examination at health care facility - Primary    Current Assessment & Plan     Orders:    Lipid Panel    Comprehensive Metabolic Panel    CBC and Auto Differential           Relevant Orders    Lipid Panel (Completed)    Comprehensive Metabolic Panel (Completed)    CBC and Auto Differential (Completed)    Postmenopausal    Current Assessment & Plan     Orders:    XR DEXA bone density; Future    Lipid Panel    Comprehensive Metabolic Panel    CBC and Auto Differential         Relevant Orders    XR DEXA bone density    Lipid Panel (Completed)    Comprehensive Metabolic Panel (Completed)    CBC and Auto Differential (Completed)    Osteopenia    Current Assessment & Plan     Orders:    XR DEXA bone density; Future    Lipid Panel     Comprehensive Metabolic Panel    CBC and Auto Differential         Relevant Orders    XR DEXA bone density    Lipid Panel (Completed)    Comprehensive Metabolic Panel (Completed)    CBC and Auto Differential (Completed)

## 2025-04-30 LAB
ALBUMIN SERPL-MCNC: 4.5 G/DL (ref 3.6–5.1)
ALP SERPL-CCNC: 61 U/L (ref 37–153)
ALT SERPL-CCNC: 13 U/L (ref 6–29)
ANION GAP SERPL CALCULATED.4IONS-SCNC: 11 MMOL/L (CALC) (ref 7–17)
AST SERPL-CCNC: 24 U/L (ref 10–35)
BASOPHILS # BLD AUTO: 131 CELLS/UL (ref 0–200)
BASOPHILS NFR BLD AUTO: 2.3 %
BILIRUB SERPL-MCNC: 0.5 MG/DL (ref 0.2–1.2)
BUN SERPL-MCNC: 18 MG/DL (ref 7–25)
CALCIUM SERPL-MCNC: 9.7 MG/DL (ref 8.6–10.4)
CHLORIDE SERPL-SCNC: 104 MMOL/L (ref 98–110)
CHOLEST SERPL-MCNC: 243 MG/DL
CHOLEST/HDLC SERPL: 3.2 (CALC)
CO2 SERPL-SCNC: 25 MMOL/L (ref 20–32)
CREAT SERPL-MCNC: 0.71 MG/DL (ref 0.6–1)
EGFRCR SERPLBLD CKD-EPI 2021: 88 ML/MIN/1.73M2
EOSINOPHIL # BLD AUTO: 91 CELLS/UL (ref 15–500)
EOSINOPHIL NFR BLD AUTO: 1.6 %
ERYTHROCYTE [DISTWIDTH] IN BLOOD BY AUTOMATED COUNT: 12.9 % (ref 11–15)
GLUCOSE SERPL-MCNC: 77 MG/DL (ref 65–99)
HCT VFR BLD AUTO: 41.5 % (ref 35–45)
HDLC SERPL-MCNC: 77 MG/DL
HGB BLD-MCNC: 13.3 G/DL (ref 11.7–15.5)
LDLC SERPL CALC-MCNC: 154 MG/DL (CALC)
LYMPHOCYTES # BLD AUTO: 1750 CELLS/UL (ref 850–3900)
LYMPHOCYTES NFR BLD AUTO: 30.7 %
MCH RBC QN AUTO: 28.1 PG (ref 27–33)
MCHC RBC AUTO-ENTMCNC: 32 G/DL (ref 32–36)
MCV RBC AUTO: 87.6 FL (ref 80–100)
MONOCYTES # BLD AUTO: 428 CELLS/UL (ref 200–950)
MONOCYTES NFR BLD AUTO: 7.5 %
NEUTROPHILS # BLD AUTO: 3300 CELLS/UL (ref 1500–7800)
NEUTROPHILS NFR BLD AUTO: 57.9 %
NONHDLC SERPL-MCNC: 166 MG/DL (CALC)
PLATELET # BLD AUTO: 258 THOUSAND/UL (ref 140–400)
PMV BLD REES-ECKER: 11.5 FL (ref 7.5–12.5)
POTASSIUM SERPL-SCNC: 4.5 MMOL/L (ref 3.5–5.3)
PROT SERPL-MCNC: 7.2 G/DL (ref 6.1–8.1)
RBC # BLD AUTO: 4.74 MILLION/UL (ref 3.8–5.1)
SODIUM SERPL-SCNC: 140 MMOL/L (ref 135–146)
TRIGL SERPL-MCNC: 41 MG/DL
WBC # BLD AUTO: 5.7 THOUSAND/UL (ref 3.8–10.8)

## 2025-05-03 PROBLEM — Z78.0 POSTMENOPAUSAL: Status: ACTIVE | Noted: 2025-05-03

## 2025-05-03 PROBLEM — M85.80 OSTEOPENIA: Status: ACTIVE | Noted: 2025-05-03

## 2025-05-03 ASSESSMENT — ENCOUNTER SYMPTOMS
RESPIRATORY NEGATIVE: 1
NEUROLOGICAL NEGATIVE: 1
CHILLS: 0
CARDIOVASCULAR NEGATIVE: 1
GASTROINTESTINAL NEGATIVE: 1
MUSCULOSKELETAL NEGATIVE: 1
ENDOCRINE NEGATIVE: 1
HEMATOLOGIC/LYMPHATIC NEGATIVE: 1
NAUSEA: 0
VOMITING: 0
FEVER: 0
PSYCHIATRIC NEGATIVE: 1
ALLERGIC/IMMUNOLOGIC NEGATIVE: 1

## 2025-05-03 NOTE — ASSESSMENT & PLAN NOTE
Orders:    XR DEXA bone density; Future    Lipid Panel    Comprehensive Metabolic Panel    CBC and Auto Differential

## 2025-05-03 NOTE — ASSESSMENT & PLAN NOTE
Blood pressures controlled continue current medication.Lotrel  What is High Blood Pressure? High blood pressure (also called hypertension) is when your blood moves through your arteries at a higher pressure than normal and that forces your heart to work harder to pump the blood.     What are the Complications of High Blood Pressure? When your blood pressure gets too high or stays high for a long time, it can cause health problems such as:    Kidney disease or kidney failure   Heart attack and heart failure   Stroke   Vision problems   Circulation problems, poor blood supply to the legs    what are the causes of High Blood Pressure? There are many different causes and your provider can help you find out what might be causing your pressure to be too high.     What are the Signs of High Blood Pressure? High blood pressure doesn’t usually have warning signs or symptoms, so many people don’t realize they have it and that is why regular monitoring is important.     Prevention and Treatment: Often high blood pressure can be controlled with lifestyle changes and when necessary, medications. Adopting heart healthy habits can help:      Maintain a Healthy Weight   Eat a heart healthy diet full of vegetables, fruits and whole grains that are high in fiber   Be Physically Active every day   Find heart healthy ways to reduce stress and increase relaxation    Don’t use tobacco products   Limit your intake of alcohol, caffeine and salt   Monitor your blood pressure regularly: ask your provider how often   Take your medications as prescribed, even when you’re feeling well   Continue lisinopril at the current dose

## 2025-07-14 ENCOUNTER — TELEPHONE (OUTPATIENT)
Dept: PRIMARY CARE | Facility: CLINIC | Age: 80
End: 2025-07-14
Payer: MEDICARE

## 2025-08-12 ENCOUNTER — HOSPITAL ENCOUNTER (OUTPATIENT)
Dept: RADIOLOGY | Facility: CLINIC | Age: 80
Discharge: HOME | End: 2025-08-12
Payer: MEDICARE

## 2025-08-12 DIAGNOSIS — M85.80 OSTEOPENIA, UNSPECIFIED LOCATION: ICD-10-CM

## 2025-08-12 DIAGNOSIS — Z78.0 POSTMENOPAUSAL: ICD-10-CM

## 2025-08-12 PROCEDURE — 77080 DXA BONE DENSITY AXIAL: CPT | Performed by: RADIOLOGY

## 2025-08-12 PROCEDURE — 77080 DXA BONE DENSITY AXIAL: CPT

## 2025-09-01 DIAGNOSIS — I10 BENIGN ESSENTIAL HYPERTENSION: ICD-10-CM

## 2025-09-01 RX ORDER — AMLODIPINE AND BENAZEPRIL HYDROCHLORIDE 5; 10 MG/1; MG/1
1 CAPSULE ORAL DAILY
Qty: 90 CAPSULE | Refills: 1 | Status: SHIPPED | OUTPATIENT
Start: 2025-09-01

## 2025-10-29 ENCOUNTER — APPOINTMENT (OUTPATIENT)
Dept: PRIMARY CARE | Facility: CLINIC | Age: 80
End: 2025-10-29
Payer: MEDICARE

## 2025-12-15 ENCOUNTER — APPOINTMENT (OUTPATIENT)
Dept: OPHTHALMOLOGY | Facility: CLINIC | Age: 80
End: 2025-12-15
Payer: MEDICARE